# Patient Record
Sex: MALE | Race: WHITE | NOT HISPANIC OR LATINO | Employment: OTHER | URBAN - METROPOLITAN AREA
[De-identification: names, ages, dates, MRNs, and addresses within clinical notes are randomized per-mention and may not be internally consistent; named-entity substitution may affect disease eponyms.]

---

## 2017-10-30 ENCOUNTER — LAB REQUISITION (OUTPATIENT)
Dept: LAB | Facility: HOSPITAL | Age: 62
End: 2017-10-30

## 2017-10-30 DIAGNOSIS — E87.5 HYPERKALEMIA: ICD-10-CM

## 2017-10-30 LAB — POTASSIUM BLD-SCNC: 4.7 MMOL/L (ref 3.5–5.2)

## 2017-10-30 PROCEDURE — 84132 ASSAY OF SERUM POTASSIUM: CPT

## 2018-11-27 ENCOUNTER — LAB REQUISITION (OUTPATIENT)
Dept: LAB | Facility: HOSPITAL | Age: 63
End: 2018-11-27

## 2018-11-27 DIAGNOSIS — Z00.00 ROUTINE GENERAL MEDICAL EXAMINATION AT A HEALTH CARE FACILITY: ICD-10-CM

## 2018-11-27 LAB — POTASSIUM BLD-SCNC: 4.6 MMOL/L (ref 3.5–5.2)

## 2018-11-27 PROCEDURE — 84132 ASSAY OF SERUM POTASSIUM: CPT

## 2019-12-03 ENCOUNTER — LAB REQUISITION (OUTPATIENT)
Dept: LAB | Facility: HOSPITAL | Age: 64
End: 2019-12-03

## 2019-12-03 DIAGNOSIS — Z00.00 ROUTINE GENERAL MEDICAL EXAMINATION AT A HEALTH CARE FACILITY: ICD-10-CM

## 2019-12-03 LAB
ANION GAP SERPL CALCULATED.3IONS-SCNC: 12.1 MMOL/L (ref 5–15)
BUN BLD-MCNC: 38 MG/DL (ref 8–23)
BUN/CREAT SERPL: 39.6 (ref 7–25)
CALCIUM SPEC-SCNC: 9 MG/DL (ref 8.6–10.5)
CHLORIDE SERPL-SCNC: 101 MMOL/L (ref 98–107)
CO2 SERPL-SCNC: 29.9 MMOL/L (ref 22–29)
CREAT BLD-MCNC: 0.96 MG/DL (ref 0.76–1.27)
GFR SERPL CREATININE-BSD FRML MDRD: 79 ML/MIN/1.73
GLUCOSE BLD-MCNC: 103 MG/DL (ref 65–99)
POTASSIUM BLD-SCNC: 4.9 MMOL/L (ref 3.5–5.2)
SODIUM BLD-SCNC: 143 MMOL/L (ref 136–145)

## 2019-12-03 PROCEDURE — 80048 BASIC METABOLIC PNL TOTAL CA: CPT

## 2020-01-21 ENCOUNTER — OFFICE VISIT (OUTPATIENT)
Dept: SURGERY | Facility: CLINIC | Age: 65
End: 2020-01-21

## 2020-01-21 VITALS
OXYGEN SATURATION: 94 % | WEIGHT: 113 LBS | HEIGHT: 64 IN | RESPIRATION RATE: 16 BRPM | DIASTOLIC BLOOD PRESSURE: 81 MMHG | HEART RATE: 79 BPM | BODY MASS INDEX: 19.29 KG/M2 | SYSTOLIC BLOOD PRESSURE: 113 MMHG

## 2020-01-21 DIAGNOSIS — K40.90 RIGHT INGUINAL HERNIA: Primary | ICD-10-CM

## 2020-01-21 PROCEDURE — 99203 OFFICE O/P NEW LOW 30 MIN: CPT | Performed by: SURGERY

## 2020-01-21 RX ORDER — LACTOBACILLUS ACIDOPHILUS / LACTOBACILLUS BULGARICUS 100 MILLION CFU STRENGTH
GRANULES ORAL 3 TIMES DAILY
COMMUNITY

## 2020-01-21 RX ORDER — DIPHENHYDRAMINE HCL 25 MG
25 CAPSULE ORAL EVERY 8 HOURS PRN
COMMUNITY

## 2020-01-21 RX ORDER — LOPERAMIDE HYDROCHLORIDE 2 MG/1
2 CAPSULE ORAL 4 TIMES DAILY PRN
COMMUNITY

## 2020-01-21 NOTE — PROGRESS NOTES
Subjective   Manoj Jordan is a 64 y.o. male who presents to the office in surgical consultation from Isma Cervantes MD for a right inguinal hernia.    History of Present Illness     The patient has a history of an an anoxic brain injury occurring after a cardiac arrest.  He is now a nursing home resident requiring significant care.  He is unable to provide any history.  His sister is at the bedside assisting with the information.  He has been noted to have a bulge in the right groin.  He is seated for much of the day and the bulge is apparently uncomfortable.  There has been no noted change in his bowel or bladder function.    Review of Systems   Unable to perform ROS: Patient nonverbal     Past Medical History:   Diagnosis Date   • Anxiety    • Brain injury (CMS/HCC)    • Depression    • GERD (gastroesophageal reflux disease)    • H/O cardiac arrest     DUE TO HEROIN OVERDOSE IN 2005   • Heroin overdose (CMS/HCC)    • Pancreatitis    • UTI (urinary tract infection)      Past Surgical History:   Procedure Laterality Date   • BRONCHOSCOPY N/A 12/20/2018    w/ washing, Aria Engel   • BRONCHOSCOPY N/A 04/04/2019    Clemente Russell   • SUPRAPUBIC CATHETER INSERTION       No family history on file.  Social History     Socioeconomic History   • Marital status:      Spouse name: Not on file   • Number of children: Not on file   • Years of education: Not on file   • Highest education level: Not on file   Tobacco Use   • Smoking status: Former Smoker   • Smokeless tobacco: Never Used   Substance and Sexual Activity   • Alcohol use: No   • Drug use: No   • Sexual activity: Defer       Objective   Physical Exam   Constitutional: He appears well-developed and well-nourished.  Non-toxic appearance.   Eyes: EOM are normal. No scleral icterus.   Pulmonary/Chest: Effort normal. No respiratory distress.   Abdominal: Soft. Normal appearance. There is no tenderness. A hernia is present.  Hernia confirmed positive in the right inguinal area (Moderately sized right inguinal hernia that contains bowel). Hernia confirmed negative in the left inguinal area.   Skin: Skin is warm and dry.       Assessment/Plan       The encounter diagnosis was Right inguinal hernia.    The patient has a moderately sized right inguinal hernia that contains bowel.  He is at risk of developing an incarceration.  He will be scheduled for a right inguinal hernia repair.  The patient's sister understands the indications, alternatives, risks, and benefits of the procedure and wishes to proceed.

## 2020-01-22 RX ORDER — CEFAZOLIN SODIUM 2 G/100ML
2 INJECTION, SOLUTION INTRAVENOUS ONCE
Status: CANCELLED | OUTPATIENT
Start: 2020-02-19 | End: 2020-01-22

## 2020-01-23 ENCOUNTER — TELEPHONE (OUTPATIENT)
Dept: SURGERY | Facility: CLINIC | Age: 65
End: 2020-01-23

## 2020-01-23 PROBLEM — K40.90 RIGHT INGUINAL HERNIA: Status: ACTIVE | Noted: 2020-01-23

## 2020-02-18 RX ORDER — LEVOCETIRIZINE DIHYDROCHLORIDE 5 MG/1
5 TABLET, FILM COATED ORAL EVERY EVENING
COMMUNITY

## 2020-02-18 RX ORDER — OMEPRAZOLE 20 MG/1
20 CAPSULE, DELAYED RELEASE ORAL DAILY
COMMUNITY

## 2020-02-18 RX ORDER — IPRATROPIUM BROMIDE AND ALBUTEROL SULFATE 2.5; .5 MG/3ML; MG/3ML
3 SOLUTION RESPIRATORY (INHALATION) EVERY 4 HOURS PRN
COMMUNITY

## 2020-02-18 RX ORDER — GUAIFENESIN 200 MG/10ML
100 LIQUID ORAL 4 TIMES DAILY PRN
COMMUNITY

## 2020-02-19 ENCOUNTER — ANESTHESIA (OUTPATIENT)
Dept: PERIOP | Facility: HOSPITAL | Age: 65
End: 2020-02-19

## 2020-02-19 ENCOUNTER — HOSPITAL ENCOUNTER (OUTPATIENT)
Facility: HOSPITAL | Age: 65
Setting detail: HOSPITAL OUTPATIENT SURGERY
Discharge: NURSING, REHAB OR LONG-TERM CARE | End: 2020-02-19
Attending: SURGERY | Admitting: SURGERY

## 2020-02-19 ENCOUNTER — ANESTHESIA EVENT (OUTPATIENT)
Dept: PERIOP | Facility: HOSPITAL | Age: 65
End: 2020-02-19

## 2020-02-19 VITALS
WEIGHT: 113.2 LBS | TEMPERATURE: 97.5 F | RESPIRATION RATE: 18 BRPM | SYSTOLIC BLOOD PRESSURE: 112 MMHG | BODY MASS INDEX: 19.33 KG/M2 | OXYGEN SATURATION: 95 % | HEIGHT: 64 IN | DIASTOLIC BLOOD PRESSURE: 70 MMHG | HEART RATE: 58 BPM

## 2020-02-19 DIAGNOSIS — K40.90 RIGHT INGUINAL HERNIA: ICD-10-CM

## 2020-02-19 PROCEDURE — 25010000002 PHENYLEPHRINE PER 1 ML: Performed by: NURSE ANESTHETIST, CERTIFIED REGISTERED

## 2020-02-19 PROCEDURE — 25010000002 FENTANYL CITRATE (PF) 100 MCG/2ML SOLUTION: Performed by: NURSE ANESTHETIST, CERTIFIED REGISTERED

## 2020-02-19 PROCEDURE — C1781 MESH (IMPLANTABLE): HCPCS | Performed by: SURGERY

## 2020-02-19 PROCEDURE — C9290 INJ, BUPIVACAINE LIPOSOME: HCPCS | Performed by: SURGERY

## 2020-02-19 PROCEDURE — 25010000002 NEOSTIGMINE PER 0.5 MG: Performed by: NURSE ANESTHETIST, CERTIFIED REGISTERED

## 2020-02-19 PROCEDURE — 25010000002 PROPOFOL 10 MG/ML EMULSION: Performed by: NURSE ANESTHETIST, CERTIFIED REGISTERED

## 2020-02-19 PROCEDURE — 25010000002 DEXAMETHASONE PER 1 MG: Performed by: NURSE ANESTHETIST, CERTIFIED REGISTERED

## 2020-02-19 PROCEDURE — 25010000003 CEFAZOLIN IN DEXTROSE 2-4 GM/100ML-% SOLUTION: Performed by: NURSE ANESTHETIST, CERTIFIED REGISTERED

## 2020-02-19 PROCEDURE — 25010000003 BUPIVACAINE LIPOSOME 1.3 % SUSPENSION: Performed by: SURGERY

## 2020-02-19 PROCEDURE — 25010000002 ONDANSETRON PER 1 MG: Performed by: NURSE ANESTHETIST, CERTIFIED REGISTERED

## 2020-02-19 PROCEDURE — 49505 PRP I/HERN INIT REDUC >5 YR: CPT | Performed by: SURGERY

## 2020-02-19 DEVICE — BARD SOFT MESH
Type: IMPLANTABLE DEVICE | Site: INGUINAL | Status: FUNCTIONAL
Brand: BARD SOFT MESH

## 2020-02-19 RX ORDER — LIDOCAINE HYDROCHLORIDE 10 MG/ML
0.5 INJECTION, SOLUTION EPIDURAL; INFILTRATION; INTRACAUDAL; PERINEURAL ONCE AS NEEDED
Status: DISCONTINUED | OUTPATIENT
Start: 2020-02-19 | End: 2020-02-19 | Stop reason: HOSPADM

## 2020-02-19 RX ORDER — SODIUM CHLORIDE 0.9 % (FLUSH) 0.9 %
3 SYRINGE (ML) INJECTION EVERY 12 HOURS SCHEDULED
Status: DISCONTINUED | OUTPATIENT
Start: 2020-02-19 | End: 2020-02-19 | Stop reason: HOSPADM

## 2020-02-19 RX ORDER — DIPHENHYDRAMINE HCL 25 MG
25 CAPSULE ORAL
Status: DISCONTINUED | OUTPATIENT
Start: 2020-02-19 | End: 2020-02-19 | Stop reason: HOSPADM

## 2020-02-19 RX ORDER — GLYCOPYRROLATE 0.2 MG/ML
INJECTION INTRAMUSCULAR; INTRAVENOUS AS NEEDED
Status: DISCONTINUED | OUTPATIENT
Start: 2020-02-19 | End: 2020-02-19 | Stop reason: SURG

## 2020-02-19 RX ORDER — FENTANYL CITRATE 50 UG/ML
50 INJECTION, SOLUTION INTRAMUSCULAR; INTRAVENOUS
Status: DISCONTINUED | OUTPATIENT
Start: 2020-02-19 | End: 2020-02-19 | Stop reason: HOSPADM

## 2020-02-19 RX ORDER — LIDOCAINE HYDROCHLORIDE 20 MG/ML
INJECTION, SOLUTION INFILTRATION; PERINEURAL AS NEEDED
Status: DISCONTINUED | OUTPATIENT
Start: 2020-02-19 | End: 2020-02-19 | Stop reason: SURG

## 2020-02-19 RX ORDER — ONDANSETRON 2 MG/ML
4 INJECTION INTRAMUSCULAR; INTRAVENOUS ONCE AS NEEDED
Status: DISCONTINUED | OUTPATIENT
Start: 2020-02-19 | End: 2020-02-19 | Stop reason: HOSPADM

## 2020-02-19 RX ORDER — OXYCODONE AND ACETAMINOPHEN 7.5; 325 MG/1; MG/1
1 TABLET ORAL ONCE AS NEEDED
Status: DISCONTINUED | OUTPATIENT
Start: 2020-02-19 | End: 2020-02-19 | Stop reason: HOSPADM

## 2020-02-19 RX ORDER — PROMETHAZINE HYDROCHLORIDE 25 MG/ML
12.5 INJECTION, SOLUTION INTRAMUSCULAR; INTRAVENOUS ONCE AS NEEDED
Status: DISCONTINUED | OUTPATIENT
Start: 2020-02-19 | End: 2020-02-19 | Stop reason: HOSPADM

## 2020-02-19 RX ORDER — DEXAMETHASONE SODIUM PHOSPHATE 4 MG/ML
INJECTION, SOLUTION INTRA-ARTICULAR; INTRALESIONAL; INTRAMUSCULAR; INTRAVENOUS; SOFT TISSUE AS NEEDED
Status: DISCONTINUED | OUTPATIENT
Start: 2020-02-19 | End: 2020-02-19 | Stop reason: SURG

## 2020-02-19 RX ORDER — PROMETHAZINE HYDROCHLORIDE 25 MG/1
25 SUPPOSITORY RECTAL ONCE AS NEEDED
Status: DISCONTINUED | OUTPATIENT
Start: 2020-02-19 | End: 2020-02-19 | Stop reason: HOSPADM

## 2020-02-19 RX ORDER — FLUMAZENIL 0.1 MG/ML
0.2 INJECTION INTRAVENOUS AS NEEDED
Status: DISCONTINUED | OUTPATIENT
Start: 2020-02-19 | End: 2020-02-19 | Stop reason: HOSPADM

## 2020-02-19 RX ORDER — CEFAZOLIN SODIUM 2 G/100ML
2 INJECTION, SOLUTION INTRAVENOUS ONCE
Status: DISCONTINUED | OUTPATIENT
Start: 2020-02-19 | End: 2020-02-19 | Stop reason: HOSPADM

## 2020-02-19 RX ORDER — FAMOTIDINE 10 MG/ML
20 INJECTION, SOLUTION INTRAVENOUS ONCE
Status: COMPLETED | OUTPATIENT
Start: 2020-02-19 | End: 2020-02-19

## 2020-02-19 RX ORDER — HYDROCODONE BITARTRATE AND ACETAMINOPHEN 7.5; 325 MG/1; MG/1
1 TABLET ORAL ONCE AS NEEDED
Status: DISCONTINUED | OUTPATIENT
Start: 2020-02-19 | End: 2020-02-19 | Stop reason: HOSPADM

## 2020-02-19 RX ORDER — MIDAZOLAM HYDROCHLORIDE 1 MG/ML
1 INJECTION INTRAMUSCULAR; INTRAVENOUS
Status: DISCONTINUED | OUTPATIENT
Start: 2020-02-19 | End: 2020-02-19 | Stop reason: HOSPADM

## 2020-02-19 RX ORDER — HYDROMORPHONE HYDROCHLORIDE 1 MG/ML
0.5 INJECTION, SOLUTION INTRAMUSCULAR; INTRAVENOUS; SUBCUTANEOUS
Status: DISCONTINUED | OUTPATIENT
Start: 2020-02-19 | End: 2020-02-19 | Stop reason: HOSPADM

## 2020-02-19 RX ORDER — LABETALOL HYDROCHLORIDE 5 MG/ML
5 INJECTION, SOLUTION INTRAVENOUS
Status: DISCONTINUED | OUTPATIENT
Start: 2020-02-19 | End: 2020-02-19 | Stop reason: HOSPADM

## 2020-02-19 RX ORDER — BUPIVACAINE HYDROCHLORIDE AND EPINEPHRINE 5; 5 MG/ML; UG/ML
INJECTION, SOLUTION PERINEURAL AS NEEDED
Status: DISCONTINUED | OUTPATIENT
Start: 2020-02-19 | End: 2020-02-19 | Stop reason: HOSPADM

## 2020-02-19 RX ORDER — HYDRALAZINE HYDROCHLORIDE 20 MG/ML
5 INJECTION INTRAMUSCULAR; INTRAVENOUS
Status: DISCONTINUED | OUTPATIENT
Start: 2020-02-19 | End: 2020-02-19 | Stop reason: HOSPADM

## 2020-02-19 RX ORDER — MIDAZOLAM HYDROCHLORIDE 1 MG/ML
2 INJECTION INTRAMUSCULAR; INTRAVENOUS
Status: DISCONTINUED | OUTPATIENT
Start: 2020-02-19 | End: 2020-02-19 | Stop reason: HOSPADM

## 2020-02-19 RX ORDER — CEFAZOLIN SODIUM 2 G/100ML
INJECTION, SOLUTION INTRAVENOUS AS NEEDED
Status: DISCONTINUED | OUTPATIENT
Start: 2020-02-19 | End: 2020-02-19 | Stop reason: SURG

## 2020-02-19 RX ORDER — ACETAMINOPHEN 325 MG/1
650 TABLET ORAL ONCE AS NEEDED
Status: DISCONTINUED | OUTPATIENT
Start: 2020-02-19 | End: 2020-02-19 | Stop reason: HOSPADM

## 2020-02-19 RX ORDER — FENTANYL CITRATE 50 UG/ML
INJECTION, SOLUTION INTRAMUSCULAR; INTRAVENOUS AS NEEDED
Status: DISCONTINUED | OUTPATIENT
Start: 2020-02-19 | End: 2020-02-19 | Stop reason: SURG

## 2020-02-19 RX ORDER — ROCURONIUM BROMIDE 10 MG/ML
INJECTION, SOLUTION INTRAVENOUS AS NEEDED
Status: DISCONTINUED | OUTPATIENT
Start: 2020-02-19 | End: 2020-02-19 | Stop reason: SURG

## 2020-02-19 RX ORDER — DIPHENHYDRAMINE HYDROCHLORIDE 50 MG/ML
12.5 INJECTION INTRAMUSCULAR; INTRAVENOUS
Status: DISCONTINUED | OUTPATIENT
Start: 2020-02-19 | End: 2020-02-19 | Stop reason: HOSPADM

## 2020-02-19 RX ORDER — NALOXONE HCL 0.4 MG/ML
0.2 VIAL (ML) INJECTION AS NEEDED
Status: DISCONTINUED | OUTPATIENT
Start: 2020-02-19 | End: 2020-02-19 | Stop reason: HOSPADM

## 2020-02-19 RX ORDER — SODIUM CHLORIDE 0.9 % (FLUSH) 0.9 %
3-10 SYRINGE (ML) INJECTION AS NEEDED
Status: DISCONTINUED | OUTPATIENT
Start: 2020-02-19 | End: 2020-02-19 | Stop reason: HOSPADM

## 2020-02-19 RX ORDER — PROPOFOL 10 MG/ML
VIAL (ML) INTRAVENOUS AS NEEDED
Status: DISCONTINUED | OUTPATIENT
Start: 2020-02-19 | End: 2020-02-19 | Stop reason: SURG

## 2020-02-19 RX ORDER — ONDANSETRON 2 MG/ML
INJECTION INTRAMUSCULAR; INTRAVENOUS AS NEEDED
Status: DISCONTINUED | OUTPATIENT
Start: 2020-02-19 | End: 2020-02-19 | Stop reason: SURG

## 2020-02-19 RX ORDER — OXYCODONE HYDROCHLORIDE AND ACETAMINOPHEN 5; 325 MG/1; MG/1
TABLET ORAL
Qty: 30 TABLET | Refills: 0 | Status: ON HOLD | OUTPATIENT
Start: 2020-02-19 | End: 2020-11-09 | Stop reason: SDUPTHER

## 2020-02-19 RX ORDER — PROMETHAZINE HYDROCHLORIDE 25 MG/1
25 TABLET ORAL ONCE AS NEEDED
Status: DISCONTINUED | OUTPATIENT
Start: 2020-02-19 | End: 2020-02-19 | Stop reason: HOSPADM

## 2020-02-19 RX ORDER — PROMETHAZINE HYDROCHLORIDE 25 MG/ML
6.25 INJECTION, SOLUTION INTRAMUSCULAR; INTRAVENOUS
Status: DISCONTINUED | OUTPATIENT
Start: 2020-02-19 | End: 2020-02-19 | Stop reason: HOSPADM

## 2020-02-19 RX ORDER — EPHEDRINE SULFATE 50 MG/ML
5 INJECTION, SOLUTION INTRAVENOUS ONCE AS NEEDED
Status: DISCONTINUED | OUTPATIENT
Start: 2020-02-19 | End: 2020-02-19 | Stop reason: HOSPADM

## 2020-02-19 RX ORDER — SODIUM CHLORIDE, SODIUM LACTATE, POTASSIUM CHLORIDE, CALCIUM CHLORIDE 600; 310; 30; 20 MG/100ML; MG/100ML; MG/100ML; MG/100ML
9 INJECTION, SOLUTION INTRAVENOUS CONTINUOUS
Status: DISCONTINUED | OUTPATIENT
Start: 2020-02-19 | End: 2020-02-19 | Stop reason: HOSPADM

## 2020-02-19 RX ADMIN — GLYCOPYRROLATE 0.6 MG: 0.2 INJECTION INTRAMUSCULAR; INTRAVENOUS at 13:26

## 2020-02-19 RX ADMIN — CEFAZOLIN SODIUM 2 G: 2 INJECTION, SOLUTION INTRAVENOUS at 12:12

## 2020-02-19 RX ADMIN — ROCURONIUM BROMIDE 40 MG: 10 INJECTION, SOLUTION INTRAVENOUS at 12:25

## 2020-02-19 RX ADMIN — PHENYLEPHRINE HYDROCHLORIDE 200 MCG: 10 INJECTION INTRAVENOUS at 12:32

## 2020-02-19 RX ADMIN — FAMOTIDINE 20 MG: 10 INJECTION INTRAVENOUS at 09:54

## 2020-02-19 RX ADMIN — LIDOCAINE HYDROCHLORIDE 100 MG: 20 INJECTION, SOLUTION INFILTRATION; PERINEURAL at 12:25

## 2020-02-19 RX ADMIN — PROPOFOL 100 MG: 10 INJECTION, EMULSION INTRAVENOUS at 12:25

## 2020-02-19 RX ADMIN — NEOSTIGMINE METHYLSULFATE 3 MG: 1 INJECTION INTRAMUSCULAR; INTRAVENOUS; SUBCUTANEOUS at 13:26

## 2020-02-19 RX ADMIN — ONDANSETRON HYDROCHLORIDE 4 MG: 2 SOLUTION INTRAMUSCULAR; INTRAVENOUS at 13:23

## 2020-02-19 RX ADMIN — FENTANYL CITRATE 50 MCG: 50 INJECTION INTRAMUSCULAR; INTRAVENOUS at 13:00

## 2020-02-19 RX ADMIN — DEXAMETHASONE SODIUM PHOSPHATE 8 MG: 4 INJECTION INTRA-ARTICULAR; INTRALESIONAL; INTRAMUSCULAR; INTRAVENOUS; SOFT TISSUE at 12:36

## 2020-02-19 RX ADMIN — SUGAMMADEX 200 MG: 100 INJECTION, SOLUTION INTRAVENOUS at 13:38

## 2020-02-19 RX ADMIN — FENTANYL CITRATE 50 MCG: 50 INJECTION INTRAMUSCULAR; INTRAVENOUS at 12:20

## 2020-02-19 RX ADMIN — SODIUM CHLORIDE, POTASSIUM CHLORIDE, SODIUM LACTATE AND CALCIUM CHLORIDE 9 ML/HR: 600; 310; 30; 20 INJECTION, SOLUTION INTRAVENOUS at 09:35

## 2020-02-19 NOTE — ANESTHESIA PROCEDURE NOTES
Airway  Urgency: elective    Date/Time: 2/19/2020 12:27 PM  Airway not difficult    General Information and Staff    Patient location during procedure: OR  Anesthesiologist: Victorino Harding MD  CRNA: Pratima Dorado CRNA    Indications and Patient Condition  Indications for airway management: airway protection    Preoxygenated: yes      Final Airway Details  Final airway type: endotracheal airway      Cuffed: yes   Successful intubation technique: direct laryngoscopy  Facilitating devices/methods: intubating stylet  Endotracheal tube insertion site: oral  Blade: Shaffer  Blade size: 2  Cormack-Lehane Classification: grade IIa - partial view of glottis  Placement verified by: chest auscultation   Measured from: lips  Number of attempts at approach: 1  Assessment: lips, teeth, and gum same as pre-op and atraumatic intubation

## 2020-02-19 NOTE — PROGRESS NOTES
Darrin advised Phase 2 needs assistance with transport of patient back to [facility]; Provided [facility] office number for assistance.     Darrin returned call and advised there is no answer at either phone number. Called phase 2 myself and spoke linda/Giovanna who provided patient information and history for medical necessity for ambulance. Called [facility] and requested patient transport to return to [facility]; provided patient history, place of residence and insurance info. .[facility] will be calling phase 2 for further information. Christel Coe RN

## 2020-02-19 NOTE — DISCHARGE INSTRUCTIONS
Dr. Erasmo Vital   [address]  [place], [place] [address] [phone]    Discharge Instructions for Hernia Surgery      1. Go home, rest and take it easy today; however, you should get up and move about several times today to reduce the risk of developing a clot in your legs.      2. You may experience some dizziness or memory loss from the anesthesia.  This may last for the next 24 hours.  Someone should plan on staying with you for the first 24 hours for your safety.    3. Do not make any important legal decisions or sign any legal papers for the next 24 hours.      4. Eat and drink lightly today.  Start off with liquids, jello, soup, crackers or other bland foods at first. You may advance your diet tomorrow as tolerated as long as you do not experience any nausea or vomiting.     5. You may remove your outer dressings in 2 days.  The white tapes called steri-strips should stay in place.  They will fall off on their own in 1-2 weeks.  Do not worry if they come off sooner.      6. You may notice some bleeding/drainage on your outer dressings. A little bloody drainage is normal. If the bleeding/drainage is such that the bandage cannot absorb it, remove the dressing, apply clean gauze and apply firm pressure for a full 15 minutes.  If the bleeding continues, please call me.    7. You may shower tomorrow.  No tub baths until your incisions are completely healed.     8. No lifting > 20 lbs. until you are seen at your follow-up visit.         9. You have received a prescription for a narcotic pain medicine, as you will have some pain following surgery.   You will not be totally pain free, but your pain medicine should make the pain tolerable.  Please take your pain medicine as prescribed and always take your pills with food to prevent nausea. If you are having severe pain that cannot be controlled by the pain medicine, please contact me.      10. If you had a laparoscopic surgery, it is not unusual to  experience pain/discomfort in your shoulders or under your ribs after surgery.  It is from the gas used during the laparoscopic procedure and usually lasts 1-3 days.  The prescription pain medicine is used to treat the surgical pain and does not typically alleviate this “gassy” pain.     11. No driving for 24 hours and for as long as you are taking your prescription pain medicine.      12. You will need to call the office at [phone] to schedule a follow-up appointment in 2 weeks.           13. Remember to contact me for any of the following:    • Fever > 101 degrees  • Severe pain that cannot be controlled by taking your pain pills  • Severe nausea or vomiting   • Significant bleeding of your incisions  • Drainage that has a bad smell or is yellow or green in appearance  • Any other questions or concerns        Additional Instruction for Inguinal Hernia Patients Only    1. If you did not urinate at the hospital after your surgery or if you feel the need to urinate and cannot, this will necessitate a return to the Emergency Room for placement of a urinary catheter.  You should also notify me as well.  As a rule, you should be able to empty your bladder within 4-6 hours after discharge from the hospital.      You may notice some scrotal bruising and/or swelling. A scrotal support or briefs as well as ice packs may be used to alleviate discomfort

## 2020-02-19 NOTE — ANESTHESIA POSTPROCEDURE EVALUATION
"Patient: Manoj Jordan    Procedure Summary     Date:  02/19/20 Room / Location:  [facility] OR 03 / [facility] MAIN OR    Anesthesia Start:  1212 Anesthesia Stop:  1407    Procedure:  RIGHT INGUINAL HERNIA REPAIR WITH MESH (Right Abdomen) Diagnosis:       Right inguinal hernia      (Right inguinal hernia [K40.90])    Surgeon:  Erasmo Vital Jr., MD Provider:  Victorino Harding MD    Anesthesia Type:  general ASA Status:  4          Anesthesia Type: general    Vitals  Vitals Value Taken Time   /95 2/19/2020  2:48 PM   Temp 36.4 °C (97.5 °F) 2/19/2020  2:02 PM   Pulse 70 2/19/2020  2:58 PM   Resp 16 2/19/2020  2:15 PM   SpO2 93 % 2/19/2020  2:58 PM   Vitals shown include unvalidated device data.        Post Anesthesia Care and Evaluation    Patient location during evaluation: bedside  Level of consciousness: awake  Pain management: adequate  Airway patency: patent  Anesthetic complications: No anesthetic complications    Cardiovascular status: acceptable  Respiratory status: acceptable  Hydration status: acceptable    Comments: /79   Pulse 74   Temp 36.4 °C (97.5 °F) (Oral)   Resp 16   Ht 162.6 cm (64\")   Wt 51.3 kg (113 lb 3.2 oz)   SpO2 100%   BMI 19.43 kg/m²         "

## 2020-02-19 NOTE — PERIOPERATIVE NURSING NOTE
Called  to see if we could use [facility].      Spoke with Qi with [facility] and stated she will call back to see if she can set up transportation.

## 2020-02-19 NOTE — PERIOPERATIVE NURSING NOTE
Called [facility] and they stated they did not have a truck to send us for the patient to be picked up.  Did not give ETA.

## 2020-02-19 NOTE — OP NOTE
Surgeon: Erasmo Vital Jr., M.D.    Assistant: Erasmo Vital MD    An assistant was necessary during this procedure by providing crucial exposure.    Pre-Operative Diagnosis:     Right inguinal hernia [K40.90]    Post-Operative Diagnosis:    Post-Op Diagnosis Codes:     * Right inguinal hernia [K40.90]    Procedure Performed:     Right inguinal hernia repair    Indications:     The patient is a 64-year-old white male with previous anoxic brain injury who is a nursing home resident and has developed a right inguinal hernia.  He is able to stand and this causes the right inguinal hernia to enlarge and and it clearly contains bowel.  He presents for right inguinal hernia repair.  The patient's sister understands the indications, alternatives, risks, and benefits of the procedure and wishes to proceed.    Procedure:     The patient was identified and taken to the operating room where he was placed in the supine position on the operating table.  Monitors were placed and he underwent a general endotracheal anesthesia and was appropriately monitored throughout the case by the anesthesia personnel.  The right groin was prepped and draped in the standard surgical fashion.  A standard groin incision was made with a scalpel and carried through the skin into the subcutaneous tissue.  The subcutaneous tissue was divided using Bovie electrocautery to the external oblique aponeurosis which was then divided in direction of its fibers.  The spermatic cord was then identified, surrounded, and elevated out of the groin with a Penrose drain.  The spermatic cord was skeletonized by dividing cremasteric fibers using Bovie electrocautery.  The patient was noted to have an indirect hernia.  The hernia was freed from attachments to the spermatic cord and divided in a high ligation fashion with 2-0 Vicryl suture.  A piece of mesh was selected, soaked in antibiotics, and fashioned appropriately using a 3 x 6 Bard soft mesh.  It was then  sutured in place using a Taylor type repair.  It was sutured to the pubic tubercle and along the inguinal ligament using 2-0 proline sutures.  It was then tacked along conjoined tendon using 0 Ethibond sutures in an interrupted fashion.  Legs were created to reestablish the internal ring and secured with the 0 Ethibond sutures.  The cord was noted to be hemostatic.  The area was infiltrated with 0.5% Marcaine with epinephrine as well as Exparel.  The spermatic cord was returned to its original position.  The external oblique aponeurosis was reapproximated using 2-0 Vicryl sutures.  Hazel's fascia was reapproximated using 3-0 Vicryl sutures in a running fashion.  The skin was then closed with 4-0 Monocryl in a subcuticular fashion followed by Mastisol and Steri-Strips and a sterile dressing.  The sponge, needle, and instrument counts were correct at the end the case.  The patient tolerated procedure well and was transferred to the recovery room in stable condition.    Estimated Blood Loss:      minimal    Specimens:     None    Erasmo Vital Jr., M.D.

## 2020-03-05 ENCOUNTER — OFFICE VISIT (OUTPATIENT)
Dept: SURGERY | Facility: CLINIC | Age: 65
End: 2020-03-05

## 2020-03-05 DIAGNOSIS — Z48.89 POSTOPERATIVE VISIT: Primary | ICD-10-CM

## 2020-03-05 PROCEDURE — 99024 POSTOP FOLLOW-UP VISIT: CPT | Performed by: SURGERY

## 2020-03-05 NOTE — PROGRESS NOTES
Subjective   Manoj Jordan is a 64 y.o. male who returns to the office after undergoing a right inguinal hernia repair on 2/19/2020.     History of Present Illness     The patient is only able to answer simple questions due to a brain injury.  He is not having any abdominal pain.  His bowel function appears to be at its baseline.    Review of Systems   Respiratory: Negative for shortness of breath.    Cardiovascular: Negative for chest pain.   Gastrointestinal: Negative for abdominal distention and abdominal pain.       Objective   Physical Exam   Abdominal: Soft. There is no tenderness.   Skin:   Incision: Intact with no evidence of infection.       Assessment/Plan   The encounter diagnosis was Postoperative visit.    The patient is recovering well from his right inguinal hernia repair.  He will follow-up on an as-needed basis.

## 2020-11-03 ENCOUNTER — HOSPITAL ENCOUNTER (INPATIENT)
Facility: HOSPITAL | Age: 65
LOS: 5 days | Discharge: NURSING, REHAB OR LONG-TERM CARE | End: 2020-11-09
Attending: EMERGENCY MEDICINE | Admitting: HOSPITALIST

## 2020-11-03 ENCOUNTER — APPOINTMENT (OUTPATIENT)
Dept: GENERAL RADIOLOGY | Facility: HOSPITAL | Age: 65
End: 2020-11-03

## 2020-11-03 DIAGNOSIS — U07.1 PNEUMONIA DUE TO COVID-19 VIRUS: Primary | ICD-10-CM

## 2020-11-03 DIAGNOSIS — J12.82 PNEUMONIA DUE TO COVID-19 VIRUS: Primary | ICD-10-CM

## 2020-11-03 DIAGNOSIS — K40.90 RIGHT INGUINAL HERNIA: ICD-10-CM

## 2020-11-03 PROCEDURE — 83880 ASSAY OF NATRIURETIC PEPTIDE: CPT | Performed by: EMERGENCY MEDICINE

## 2020-11-03 PROCEDURE — 80053 COMPREHEN METABOLIC PANEL: CPT | Performed by: EMERGENCY MEDICINE

## 2020-11-03 PROCEDURE — 87040 BLOOD CULTURE FOR BACTERIA: CPT | Performed by: EMERGENCY MEDICINE

## 2020-11-03 PROCEDURE — 84484 ASSAY OF TROPONIN QUANT: CPT | Performed by: EMERGENCY MEDICINE

## 2020-11-03 PROCEDURE — 85025 COMPLETE CBC W/AUTO DIFF WBC: CPT | Performed by: EMERGENCY MEDICINE

## 2020-11-03 PROCEDURE — 99285 EMERGENCY DEPT VISIT HI MDM: CPT

## 2020-11-03 PROCEDURE — 84145 PROCALCITONIN (PCT): CPT | Performed by: EMERGENCY MEDICINE

## 2020-11-03 PROCEDURE — 93005 ELECTROCARDIOGRAM TRACING: CPT | Performed by: EMERGENCY MEDICINE

## 2020-11-03 PROCEDURE — 87086 URINE CULTURE/COLONY COUNT: CPT | Performed by: HOSPITALIST

## 2020-11-03 PROCEDURE — 71045 X-RAY EXAM CHEST 1 VIEW: CPT

## 2020-11-03 PROCEDURE — 83605 ASSAY OF LACTIC ACID: CPT | Performed by: EMERGENCY MEDICINE

## 2020-11-03 PROCEDURE — 81001 URINALYSIS AUTO W/SCOPE: CPT | Performed by: EMERGENCY MEDICINE

## 2020-11-03 RX ORDER — SODIUM CHLORIDE 0.9 % (FLUSH) 0.9 %
10 SYRINGE (ML) INJECTION AS NEEDED
Status: DISCONTINUED | OUTPATIENT
Start: 2020-11-03 | End: 2020-11-09 | Stop reason: HOSPADM

## 2020-11-04 PROBLEM — J12.82 PNEUMONIA DUE TO COVID-19 VIRUS: Status: ACTIVE | Noted: 2020-11-04

## 2020-11-04 PROBLEM — U07.1 PNEUMONIA DUE TO COVID-19 VIRUS: Status: ACTIVE | Noted: 2020-11-04

## 2020-11-04 LAB
ALBUMIN SERPL-MCNC: 3.2 G/DL (ref 3.5–5.2)
ALBUMIN SERPL-MCNC: 3.2 G/DL (ref 3.5–5.2)
ALBUMIN/GLOB SERPL: 0.8 G/DL
ALP SERPL-CCNC: 76 U/L (ref 39–117)
ALP SERPL-CCNC: 79 U/L (ref 39–117)
ALT SERPL W P-5'-P-CCNC: 34 U/L (ref 1–41)
ALT SERPL W P-5'-P-CCNC: 34 U/L (ref 1–41)
ANION GAP SERPL CALCULATED.3IONS-SCNC: 6.9 MMOL/L (ref 5–15)
ANION GAP SERPL CALCULATED.3IONS-SCNC: 7.9 MMOL/L (ref 5–15)
AST SERPL-CCNC: 47 U/L (ref 1–40)
AST SERPL-CCNC: 53 U/L (ref 1–40)
B PARAPERT DNA SPEC QL NAA+PROBE: NOT DETECTED
B PERT DNA SPEC QL NAA+PROBE: NOT DETECTED
BACTERIA UR QL AUTO: ABNORMAL /HPF
BASOPHILS # BLD AUTO: 0.02 10*3/MM3 (ref 0–0.2)
BASOPHILS NFR BLD AUTO: 0.4 % (ref 0–1.5)
BILIRUB CONJ SERPL-MCNC: 0.3 MG/DL (ref 0–0.3)
BILIRUB INDIRECT SERPL-MCNC: 0.6 MG/DL
BILIRUB SERPL-MCNC: 0.9 MG/DL (ref 0–1.2)
BILIRUB SERPL-MCNC: 1.3 MG/DL (ref 0–1.2)
BILIRUB UR QL STRIP: NEGATIVE
BUN SERPL-MCNC: 35 MG/DL (ref 8–23)
BUN SERPL-MCNC: 36 MG/DL (ref 8–23)
BUN/CREAT SERPL: 35.7 (ref 7–25)
BUN/CREAT SERPL: 37.9 (ref 7–25)
C PNEUM DNA NPH QL NAA+NON-PROBE: NOT DETECTED
CALCIUM SPEC-SCNC: 9.3 MG/DL (ref 8.6–10.5)
CALCIUM SPEC-SCNC: 9.5 MG/DL (ref 8.6–10.5)
CHLORIDE SERPL-SCNC: 107 MMOL/L (ref 98–107)
CHLORIDE SERPL-SCNC: 110 MMOL/L (ref 98–107)
CLARITY UR: ABNORMAL
CO2 SERPL-SCNC: 30.1 MMOL/L (ref 22–29)
CO2 SERPL-SCNC: 31.1 MMOL/L (ref 22–29)
COLOR UR: ABNORMAL
CREAT SERPL-MCNC: 0.95 MG/DL (ref 0.76–1.27)
CREAT SERPL-MCNC: 0.98 MG/DL (ref 0.76–1.27)
CREAT SERPL-MCNC: 1.04 MG/DL (ref 0.76–1.27)
D DIMER PPP FEU-MCNC: 0.71 MCGFEU/ML (ref 0–0.49)
D-LACTATE SERPL-SCNC: 1.3 MMOL/L (ref 0.5–2)
DEPRECATED RDW RBC AUTO: 50.5 FL (ref 37–54)
DEPRECATED RDW RBC AUTO: 50.6 FL (ref 37–54)
EOSINOPHIL # BLD AUTO: 0.01 10*3/MM3 (ref 0–0.4)
EOSINOPHIL NFR BLD AUTO: 0.2 % (ref 0.3–6.2)
ERYTHROCYTE [DISTWIDTH] IN BLOOD BY AUTOMATED COUNT: 14.9 % (ref 12.3–15.4)
ERYTHROCYTE [DISTWIDTH] IN BLOOD BY AUTOMATED COUNT: 15.2 % (ref 12.3–15.4)
FERRITIN SERPL-MCNC: 444 NG/ML (ref 30–400)
FLUAV SUBTYP SPEC NAA+PROBE: NOT DETECTED
FLUBV RNA ISLT QL NAA+PROBE: NOT DETECTED
GFR SERPL CREATININE-BSD FRML MDRD: 72 ML/MIN/1.73
GFR SERPL CREATININE-BSD FRML MDRD: 77 ML/MIN/1.73
GFR SERPL CREATININE-BSD FRML MDRD: 80 ML/MIN/1.73
GLOBULIN UR ELPH-MCNC: 4.1 GM/DL
GLUCOSE SERPL-MCNC: 100 MG/DL (ref 65–99)
GLUCOSE SERPL-MCNC: 124 MG/DL (ref 65–99)
GLUCOSE UR STRIP-MCNC: ABNORMAL MG/DL
HADV DNA SPEC NAA+PROBE: NOT DETECTED
HCOV 229E RNA SPEC QL NAA+PROBE: NOT DETECTED
HCOV HKU1 RNA SPEC QL NAA+PROBE: NOT DETECTED
HCOV NL63 RNA SPEC QL NAA+PROBE: NOT DETECTED
HCOV OC43 RNA SPEC QL NAA+PROBE: NOT DETECTED
HCT VFR BLD AUTO: 41.4 % (ref 37.5–51)
HCT VFR BLD AUTO: 42.5 % (ref 37.5–51)
HGB BLD-MCNC: 13.2 G/DL (ref 13–17.7)
HGB BLD-MCNC: 13.7 G/DL (ref 13–17.7)
HGB UR QL STRIP.AUTO: ABNORMAL
HMPV RNA NPH QL NAA+NON-PROBE: NOT DETECTED
HPIV1 RNA SPEC QL NAA+PROBE: NOT DETECTED
HPIV2 RNA SPEC QL NAA+PROBE: NOT DETECTED
HPIV3 RNA NPH QL NAA+PROBE: NOT DETECTED
HPIV4 P GENE NPH QL NAA+PROBE: NOT DETECTED
HYALINE CASTS UR QL AUTO: ABNORMAL /LPF
IMM GRANULOCYTES # BLD AUTO: 0.03 10*3/MM3 (ref 0–0.05)
IMM GRANULOCYTES NFR BLD AUTO: 0.6 % (ref 0–0.5)
KETONES UR QL STRIP: NEGATIVE
LDH SERPL-CCNC: 254 U/L (ref 135–225)
LEUKOCYTE ESTERASE UR QL STRIP.AUTO: ABNORMAL
LYMPHOCYTES # BLD AUTO: 0.97 10*3/MM3 (ref 0.7–3.1)
LYMPHOCYTES NFR BLD AUTO: 19.2 % (ref 19.6–45.3)
M PNEUMO IGG SER IA-ACNC: NOT DETECTED
MCH RBC QN AUTO: 29.2 PG (ref 26.6–33)
MCH RBC QN AUTO: 29.5 PG (ref 26.6–33)
MCHC RBC AUTO-ENTMCNC: 31.9 G/DL (ref 31.5–35.7)
MCHC RBC AUTO-ENTMCNC: 32.2 G/DL (ref 31.5–35.7)
MCV RBC AUTO: 91.6 FL (ref 79–97)
MCV RBC AUTO: 91.6 FL (ref 79–97)
MONOCYTES # BLD AUTO: 0.55 10*3/MM3 (ref 0.1–0.9)
MONOCYTES NFR BLD AUTO: 10.9 % (ref 5–12)
NEUTROPHILS NFR BLD AUTO: 3.47 10*3/MM3 (ref 1.7–7)
NEUTROPHILS NFR BLD AUTO: 68.7 % (ref 42.7–76)
NITRITE UR QL STRIP: NEGATIVE
NRBC BLD AUTO-RTO: 0 /100 WBC (ref 0–0.2)
NT-PROBNP SERPL-MCNC: 104.1 PG/ML (ref 0–900)
PH UR STRIP.AUTO: >=9 [PH] (ref 5–8)
PLATELET # BLD AUTO: 118 10*3/MM3 (ref 140–450)
PLATELET # BLD AUTO: 119 10*3/MM3 (ref 140–450)
PMV BLD AUTO: 11.9 FL (ref 6–12)
PMV BLD AUTO: 12 FL (ref 6–12)
POTASSIUM SERPL-SCNC: 4.2 MMOL/L (ref 3.5–5.2)
POTASSIUM SERPL-SCNC: 4.8 MMOL/L (ref 3.5–5.2)
PROCALCITONIN SERPL-MCNC: 0.18 NG/ML (ref 0–0.25)
PROCALCITONIN SERPL-MCNC: 0.19 NG/ML (ref 0–0.25)
PROT SERPL-MCNC: 7 G/DL (ref 6–8.5)
PROT SERPL-MCNC: 7.3 G/DL (ref 6–8.5)
PROT UR QL STRIP: ABNORMAL
QT INTERVAL: 408 MS
RBC # BLD AUTO: 4.52 10*6/MM3 (ref 4.14–5.8)
RBC # BLD AUTO: 4.64 10*6/MM3 (ref 4.14–5.8)
RBC # UR: ABNORMAL /HPF
REF LAB TEST METHOD: ABNORMAL
RHINOVIRUS RNA SPEC NAA+PROBE: NOT DETECTED
RSV RNA NPH QL NAA+NON-PROBE: NOT DETECTED
SARS-COV-2 RNA NPH QL NAA+NON-PROBE: DETECTED
SODIUM SERPL-SCNC: 146 MMOL/L (ref 136–145)
SODIUM SERPL-SCNC: 147 MMOL/L (ref 136–145)
SP GR UR STRIP: 1.02 (ref 1–1.03)
SQUAMOUS #/AREA URNS HPF: ABNORMAL /HPF
TRI-PHOS CRY URNS QL MICRO: ABNORMAL /HPF
TROPONIN T SERPL-MCNC: <0.01 NG/ML (ref 0–0.03)
UNIDENT CRYS URNS QL MICRO: ABNORMAL /HPF
UROBILINOGEN UR QL STRIP: ABNORMAL
WBC # BLD AUTO: 4.15 10*3/MM3 (ref 3.4–10.8)
WBC # BLD AUTO: 5.05 10*3/MM3 (ref 3.4–10.8)
WBC UR QL AUTO: ABNORMAL /HPF

## 2020-11-04 PROCEDURE — 80076 HEPATIC FUNCTION PANEL: CPT | Performed by: HOSPITALIST

## 2020-11-04 PROCEDURE — 82565 ASSAY OF CREATININE: CPT | Performed by: HOSPITALIST

## 2020-11-04 PROCEDURE — 36415 COLL VENOUS BLD VENIPUNCTURE: CPT | Performed by: NURSE PRACTITIONER

## 2020-11-04 PROCEDURE — 0202U NFCT DS 22 TRGT SARS-COV-2: CPT | Performed by: EMERGENCY MEDICINE

## 2020-11-04 PROCEDURE — 25010000002 DEXAMETHASONE SODIUM PHOSPHATE 20 MG/5ML SOLUTION: Performed by: EMERGENCY MEDICINE

## 2020-11-04 PROCEDURE — 85027 COMPLETE CBC AUTOMATED: CPT | Performed by: NURSE PRACTITIONER

## 2020-11-04 PROCEDURE — 85379 FIBRIN DEGRADATION QUANT: CPT | Performed by: HOSPITALIST

## 2020-11-04 PROCEDURE — 84145 PROCALCITONIN (PCT): CPT | Performed by: HOSPITALIST

## 2020-11-04 PROCEDURE — 25010000002 ENOXAPARIN PER 10 MG: Performed by: NURSE PRACTITIONER

## 2020-11-04 PROCEDURE — 25010000002 CEFTRIAXONE PER 250 MG: Performed by: NURSE PRACTITIONER

## 2020-11-04 PROCEDURE — 82728 ASSAY OF FERRITIN: CPT | Performed by: HOSPITALIST

## 2020-11-04 PROCEDURE — 93010 ELECTROCARDIOGRAM REPORT: CPT | Performed by: INTERNAL MEDICINE

## 2020-11-04 PROCEDURE — 80048 BASIC METABOLIC PNL TOTAL CA: CPT | Performed by: NURSE PRACTITIONER

## 2020-11-04 PROCEDURE — XW033E5 INTRODUCTION OF REMDESIVIR ANTI-INFECTIVE INTO PERIPHERAL VEIN, PERCUTANEOUS APPROACH, NEW TECHNOLOGY GROUP 5: ICD-10-PCS | Performed by: HOSPITALIST

## 2020-11-04 PROCEDURE — 83615 LACTATE (LD) (LDH) ENZYME: CPT | Performed by: HOSPITALIST

## 2020-11-04 RX ORDER — BISACODYL 10 MG
10 SUPPOSITORY, RECTAL RECTAL DAILY PRN
Status: DISCONTINUED | OUTPATIENT
Start: 2020-11-04 | End: 2020-11-09 | Stop reason: HOSPADM

## 2020-11-04 RX ORDER — ONDANSETRON 2 MG/ML
4 INJECTION INTRAMUSCULAR; INTRAVENOUS EVERY 6 HOURS PRN
Status: DISCONTINUED | OUTPATIENT
Start: 2020-11-04 | End: 2020-11-09 | Stop reason: HOSPADM

## 2020-11-04 RX ORDER — ACETAMINOPHEN 160 MG/5ML
650 SOLUTION ORAL EVERY 4 HOURS PRN
Status: DISCONTINUED | OUTPATIENT
Start: 2020-11-04 | End: 2020-11-09 | Stop reason: HOSPADM

## 2020-11-04 RX ORDER — DEXAMETHASONE SODIUM PHOSPHATE 4 MG/ML
6 INJECTION, SOLUTION INTRA-ARTICULAR; INTRALESIONAL; INTRAMUSCULAR; INTRAVENOUS; SOFT TISSUE DAILY
Status: DISCONTINUED | OUTPATIENT
Start: 2020-11-05 | End: 2020-11-04

## 2020-11-04 RX ORDER — SODIUM CHLORIDE 9 MG/ML
75 INJECTION, SOLUTION INTRAVENOUS CONTINUOUS
Status: DISCONTINUED | OUTPATIENT
Start: 2020-11-04 | End: 2020-11-04

## 2020-11-04 RX ORDER — PANTOPRAZOLE SODIUM 40 MG/1
40 TABLET, DELAYED RELEASE ORAL EVERY MORNING
Status: DISCONTINUED | OUTPATIENT
Start: 2020-11-05 | End: 2020-11-05

## 2020-11-04 RX ORDER — CALCIUM CARBONATE 200(500)MG
2 TABLET,CHEWABLE ORAL 2 TIMES DAILY PRN
Status: DISCONTINUED | OUTPATIENT
Start: 2020-11-04 | End: 2020-11-09 | Stop reason: HOSPADM

## 2020-11-04 RX ORDER — B-COMPLEX WITH VITAMIN C
50 TABLET ORAL DAILY
COMMUNITY

## 2020-11-04 RX ORDER — SODIUM CHLORIDE 0.9 % (FLUSH) 0.9 %
10 SYRINGE (ML) INJECTION EVERY 12 HOURS SCHEDULED
Status: DISCONTINUED | OUTPATIENT
Start: 2020-11-04 | End: 2020-11-09 | Stop reason: HOSPADM

## 2020-11-04 RX ORDER — DIPHENHYDRAMINE HCL 25 MG
25 CAPSULE ORAL EVERY 8 HOURS PRN
Status: DISCONTINUED | OUTPATIENT
Start: 2020-11-04 | End: 2020-11-09 | Stop reason: HOSPADM

## 2020-11-04 RX ORDER — MENTHOL/ZINC OXIDE 0.44-20.6%
1 OINTMENT (GRAM) TOPICAL 2 TIMES DAILY
COMMUNITY

## 2020-11-04 RX ORDER — POLYETHYLENE GLYCOL 3350
17 GRANULES (GRAM) MISCELLANEOUS DAILY
COMMUNITY

## 2020-11-04 RX ORDER — CEFTRIAXONE SODIUM 1 G/50ML
1 INJECTION, SOLUTION INTRAVENOUS EVERY 24 HOURS
Status: DISCONTINUED | OUTPATIENT
Start: 2020-11-04 | End: 2020-11-05

## 2020-11-04 RX ORDER — BISACODYL 5 MG/1
5 TABLET, DELAYED RELEASE ORAL DAILY PRN
Status: DISCONTINUED | OUTPATIENT
Start: 2020-11-04 | End: 2020-11-09 | Stop reason: HOSPADM

## 2020-11-04 RX ORDER — ONDANSETRON 4 MG/1
4 TABLET, FILM COATED ORAL EVERY 6 HOURS PRN
Status: DISCONTINUED | OUTPATIENT
Start: 2020-11-04 | End: 2020-11-09 | Stop reason: HOSPADM

## 2020-11-04 RX ORDER — MELATONIN
5000 DAILY
Status: DISCONTINUED | OUTPATIENT
Start: 2020-11-04 | End: 2020-11-09 | Stop reason: HOSPADM

## 2020-11-04 RX ORDER — DEXAMETHASONE SODIUM PHOSPHATE 4 MG/ML
6 INJECTION, SOLUTION INTRA-ARTICULAR; INTRALESIONAL; INTRAMUSCULAR; INTRAVENOUS; SOFT TISSUE ONCE
Status: COMPLETED | OUTPATIENT
Start: 2020-11-04 | End: 2020-11-04

## 2020-11-04 RX ORDER — LOPERAMIDE HYDROCHLORIDE 2 MG/1
2 CAPSULE ORAL 4 TIMES DAILY PRN
Status: DISCONTINUED | OUTPATIENT
Start: 2020-11-04 | End: 2020-11-09 | Stop reason: HOSPADM

## 2020-11-04 RX ORDER — SODIUM CHLORIDE 0.9 % (FLUSH) 0.9 %
10 SYRINGE (ML) INJECTION AS NEEDED
Status: DISCONTINUED | OUTPATIENT
Start: 2020-11-04 | End: 2020-11-09 | Stop reason: HOSPADM

## 2020-11-04 RX ORDER — ACETAMINOPHEN 650 MG/1
650 SUPPOSITORY RECTAL EVERY 4 HOURS PRN
Status: DISCONTINUED | OUTPATIENT
Start: 2020-11-04 | End: 2020-11-09 | Stop reason: HOSPADM

## 2020-11-04 RX ORDER — NITROGLYCERIN 0.4 MG/1
0.4 TABLET SUBLINGUAL
Status: DISCONTINUED | OUTPATIENT
Start: 2020-11-04 | End: 2020-11-09 | Stop reason: HOSPADM

## 2020-11-04 RX ORDER — ACETAMINOPHEN 325 MG/1
650 TABLET ORAL EVERY 4 HOURS PRN
Status: DISCONTINUED | OUTPATIENT
Start: 2020-11-04 | End: 2020-11-09 | Stop reason: HOSPADM

## 2020-11-04 RX ORDER — ASCORBIC ACID 500 MG
500 TABLET ORAL DAILY
COMMUNITY

## 2020-11-04 RX ADMIN — Medication: at 20:51

## 2020-11-04 RX ADMIN — SODIUM CHLORIDE 75 ML/HR: 9 INJECTION, SOLUTION INTRAVENOUS at 02:47

## 2020-11-04 RX ADMIN — Medication 5000 UNITS: at 14:43

## 2020-11-04 RX ADMIN — REMDESIVIR 200 MG: 100 INJECTION, POWDER, LYOPHILIZED, FOR SOLUTION INTRAVENOUS at 14:43

## 2020-11-04 RX ADMIN — SODIUM CHLORIDE, PRESERVATIVE FREE 10 ML: 5 INJECTION INTRAVENOUS at 20:51

## 2020-11-04 RX ADMIN — CEFTRIAXONE SODIUM 1 G: 1 INJECTION, SOLUTION INTRAVENOUS at 02:44

## 2020-11-04 RX ADMIN — ENOXAPARIN SODIUM 40 MG: 40 INJECTION SUBCUTANEOUS at 08:10

## 2020-11-04 RX ADMIN — DEXAMETHASONE SODIUM PHOSPHATE 6 MG: 4 INJECTION, SOLUTION INTRAMUSCULAR; INTRAVENOUS at 02:43

## 2020-11-04 RX ADMIN — Medication: at 14:44

## 2020-11-04 NOTE — PLAN OF CARE
Goal Outcome Evaluation:  Plan of Care Reviewed With: patient  Progress: no change  Outcome Summary: Patient admitted overnight with PNA d/t COVID. Patient non verbal at baseline d/t hx of TBI. G-tube in place with scant amounts of tan drainage from site. Supra Pubic catheter in place with little output. Patient on 3L oxygen. IVF started. Will continue to monitor.    Goal: Patient-Specific Goal (Individualized)  Outcome: Ongoing, Progressing  Goal: Absence of Hospital-Acquired Illness or Injury  Outcome: Ongoing, Progressing  Intervention: Identify and Manage Fall Risk  Recent Flowsheet Documentation  Taken 11/4/2020 0420 by Ruma Blanton RN  Safety Promotion/Fall Prevention:   activity supervised   assistive device/personal items within reach   clutter free environment maintained   fall prevention program maintained   room organization consistent   safety round/check completed  Intervention: Prevent Skin Injury  Recent Flowsheet Documentation  Taken 11/4/2020 0420 by Ruma Blanton RN  Body Position:   turned   tilted, right  Goal: Optimal Comfort and Wellbeing  Outcome: Ongoing, Progressing  Intervention: Provide Person-Centered Care  Recent Flowsheet Documentation  Taken 11/4/2020 0420 by Ruma Blanton RN  Trust Relationship/Rapport:   care explained   reassurance provided  Goal: Readiness for Transition of Care  Outcome: Ongoing, Progressing  Intervention: Mutually Develop Transition Plan  Recent Flowsheet Documentation  Taken 11/4/2020 0443 by Ruma Blanton RN  Transportation Anticipated: health plan transportation  Patient/Family Anticipated Services at Transition:   Patient/Family Anticipates Transition to: long-term care facility  Taken 11/4/2020 0438 by Ruma Blanton RN  Equipment Currently Used at Home: hospital bed      Problem: Skin Injury Risk Increased  Goal: Skin Health and Integrity  Outcome: Ongoing, Progressing  Intervention: Optimize Skin Protection  Recent Flowsheet Documentation  Taken  11/4/2020 0420 by Ruma Blanton RN  Pressure Reduction Techniques:   heels elevated off bed   weight shift assistance provided  Head of Bed (HOB): HOB at 45 degrees  Skin Protection:   adhesive use limited   incontinence pads utilized     Problem: Fall Injury Risk  Goal: Absence of Fall and Fall-Related Injury  Outcome: Ongoing, Progressing  Intervention: Promote Injury-Free Environment  Recent Flowsheet Documentation  Taken 11/4/2020 0420 by Ruma Blanton RN  Safety Promotion/Fall Prevention:   activity supervised   assistive device/personal items within reach   clutter free environment maintained   fall prevention program maintained   room organization consistent   safety round/check completed     Problem: Infection  Goal: Infection Symptom Resolution  Outcome: Ongoing, Progressing     Problem: UTI (Urinary Tract Infection)  Goal: Improved Infection Symptoms  Outcome: Ongoing, Progressing

## 2020-11-04 NOTE — ED NOTES
Pt brief changed of moderate amount of black stool, pt's suprapubic catheter irrigated with 30 ml of sterile water using sterile technique per dr tesfaye verbal order, no return of urine, although easily when irrigating. md bernstein made aware of all. Pt repositioned and warm blankets and pillow provided for comfort, pt alert, rr labored.      Lisa Morales, RN  11/04/20 0138

## 2020-11-04 NOTE — CONSULTS
"Adult Nutrition  Assessment/PES    Patient Name:  Manoj Jordan  YOB: 1955  MRN: [id number]  Admit Date:  11/3/2020    Assessment Date:  11/4/2020    Comments:  Nutrition consult per nurse admission screen.  Patient admitted with COVID 19 PNA.  Patient is nonverbal at baseline.  PMH includes TBI, CVA, dysphagia, quadriplegia.  Patient with PEG in place.    Per paper chart from NH documentation, patient receives TFs of Nutren 1.5 overnight at 75 ml/hr + bolus of 275 mL at 2 pm daily.    Once TFs are okay to be resumed, recommend starting TFs of Nutren 1.5 at 8 pm at goal rate of 75 ml/hr - run from 8 pm to 9 am (13 hours).  Free water flushes of 45 ml q hr while TFs are running.  Also recommend bolus of 275 mL at 2 pm with 100 ml free water flush before and after.    RD to continue to follow closely.    Reason for Assessment     Row Name 11/04/20 1055          Reason for Assessment    Reason For Assessment  identified at risk by screening criteria;nurse/nurse practitioner consult     Diagnosis  neurologic conditions;psychosocial;gastrointestinal disease;substance use/abuse;other (see comments);pulmonary disease;infection/sepsis CVA, aphasia, anxiety, bowel obstruction, TBI, depression, dysphagia, GERD, heroin OD, pancreatitis, quadriplegia; adm with COVID 19 PNA     Identified At Risk by Screening Criteria  tube feeding or parenteral nutrition;MST SCORE 2+         Nutrition/Diet History     Row Name 11/04/20 1056          Nutrition/Diet History    Typical Food/Fluid Intake  patient with PEG, receives TFs of Nutren 1.5 at 75 ml/hr overnight until 1000 mL reached, also receives a bolus of 275 mL at 2 pm         Anthropometrics     Row Name 11/04/20 1058 11/04/20 0418       Anthropometrics    Height  165.1 cm (65\")  --    Weight  --  50.8 kg (112 lb)       Admit Weight    Admit Weight  -- 112# 11/4  --       Ideal Body Weight (IBW)    Ideal Body Weight (IBW) (kg)  62.51  --       Body Mass Index " "(BMI)    BMI Assessment  BMI 18.5-24.9: normal 18.60  --        Labs/Tests/Procedures/Meds     Row Name 11/04/20 1100          Labs/Procedures/Meds    Lab Results Reviewed  reviewed, pertinent     Lab Results Comments  Gluc, Na, BUN, Platelets        Diagnostic Tests/Procedures    Diagnostic Test/Procedure Reviewed  reviewed, pertinent        Medications    Pertinent Medications Reviewed  reviewed, pertinent     Pertinent Medications Comments  decadron, lovenox         Physical Findings     Row Name 11/04/20 1101          Physical Findings    Overall Physical Appearance  tetraplegia (quadriplegia);on oxygen therapy     Gastrointestinal  feeding tube     Tubes  PEG     Skin  -- B=13, intact         Estimated/Assessed Needs     Row Name 11/04/20 1101 11/04/20 1058       Calculation Measurements    Weight Used For Calculations  50.8 kg (111 lb 15.9 oz)  --    Height  --  165.1 cm (65\")       Estimated/Assessed Needs       KCAL/KG    KCAL/KG  30 Kcal/Kg (kcal);35 Kcal/Kg (kcal)  --    30 Kcal/Kg (kcal)  1524  --    35 Kcal/Kg (kcal)  1778  --       Protein Requirements    Weight Used For Protein Calculations  50.8 kg (111 lb 15.9 oz)  --    Est Protein Requirement Amount (gms/kg)  1.5 gm protein 61-76  --    Estimated Protein Requirements (gms/day)  76.2  --       Fluid Requirements    Fluid Requirements (mL/day)  1524  --        Nutrition Prescription Ordered     Row Name 11/04/20 1106          Nutrition Prescription PO    Current PO Diet  NPO         Problem/Interventions:  Problem 1     Row Name 11/04/20 1106          Nutrition Diagnoses Problem 1    Problem 1  Needs Alternate Route     Etiology (related to)  Medical Diagnosis     Neurological  TBI;Dysphagia     Signs/Symptoms (evidenced by)  Report/Observation         Intervention Goal     Row Name 11/04/20 1107          Intervention Goal    General  Maintain nutrition;Disease management/therapy;Meet nutritional needs for age/condition;Nutrition support treatment  "    TF/PN  Inititiate TF/PN;Establish TF tolerance;Tolerate TF at goal     Weight  Maintain weight         Nutrition Intervention     Row Name 11/04/20 1107          Nutrition Intervention    RD/Tech Action  Follow Tx progress;Care plan reviewd;Recommend/ordered     Recommended/Ordered  EN         Nutrition Prescription     Row Name 11/04/20 1107          Nutrition Prescription EN    Enteral Prescription  Enteral begin/change;Enteral to supply     Enteral Route  PEG     Product  Nutren 1.5 prakash     TF Delivery Method  Cyclic;Bolus     TF Bolus Goal Volume (mL)  275 mL     TF Bolus Frequency  Daily at 2 pm with 100 ml flush before and after     Cyclic TF Goal Volume (mL)  1000 mL     Cyclic TF Goal Rate (mL/hr)  75 mL/hr     Cyclic TF Cycle Over (hrs)   8 pm to 9 am     Water flush (mL)   45 mL     Water Flush Frequency  Per hour        EN to Supply    Kcal/Day  1876 Kcal/Day     Protein (gm/day)  85 gm/day     Meet Estimated Kcal Need (%)  100 %     Meet Estimated Protein Need (%)  100 %     TF Free H2O (mL)  950 mL     Total Free H2O (mL/day)  1735 mL/day         Education/Evaluation     Row Name 11/04/20 1110          Education    Education  Education not appropriate at this time     Please explain  Patient nonverbal        Monitor/Evaluation    Monitor  Per protocol;I&O;Pertinent labs;TF delivery/tolerance;Weight;Skin status;Symptoms           Electronically signed by:  Nikole Kwon RD  11/04/20 11:10 EST

## 2020-11-04 NOTE — ED NOTES
Pt noted to have gtube that is intact with no noted drainage or redness.   suprapubic catheter noted to be in place, slight redness noted around site. approx 10 ml of urine out of suprapubic catheter, large amount of sediment noted in catheter and tubing, dr bernstein made aware of all.      Lisa Morales, RN  11/04/20 0032

## 2020-11-04 NOTE — ED NOTES
Pt to ED from [facility] per [facility] with reports of being covid +, pt was dropping oxygen saturations at facility.  Pt is alert and has no complaints per EMS.      EMS had difficulty getting accurate saturations, but report RA sat of 92% with wheezes in BL upper lobes.      All triage performed with this RN wearing appropriate PPE.  Pt placed in mask upon arrival to ED.     Yanni Martins, RN  11/03/20 2694

## 2020-11-04 NOTE — PROGRESS NOTES
[facility]  Clinical Pharmacy Department     Remdesivir Review Note    Manoj Jordan is a 65 y.o. male with confirmed COVID-19 infection on day 2 of hospitalization.     Consulting Provider:  Dr Hansen  Date of Confirmed SARS-CoV-2: 11/4 (diagnosised PTA at NH)  Date of Symptom Onset: unknown  Planned Duration of Therapy: 5 days  Other Antimicrobials: ceftriaxone  Hydroxychloroquine or chloroquine prior to arrival: no    Allergies  Allergies as of 11/03/2020    (No Known Allergies)       Microbiology:  Microbiology Results (last 10 days)       Procedure Component Value - Date/Time    Respiratory Panel PCR w/COVID-19(SARS-CoV-2) ILDEFONSO/KELSIE/LIDA/PAD/COR/MAD/BASHIR In-House, NP Swab in UTM/VTM, 3-4 HR TAT - Swab, Nasopharynx [[id number]]  (Abnormal) Collected: 11/04/20 0300    Lab Status: Final result Specimen: Swab from Nasopharynx Updated: 11/04/20 0430     ADENOVIRUS, PCR Not Detected     Coronavirus 229E Not Detected     Coronavirus HKU1 Not Detected     Coronavirus NL63 Not Detected     Coronavirus OC43 Not Detected     COVID19 Detected     Human Metapneumovirus Not Detected     Human Rhinovirus/Enterovirus Not Detected     Influenza A PCR Not Detected     Influenza B PCR Not Detected     Parainfluenza Virus 1 Not Detected     Parainfluenza Virus 2 Not Detected     Parainfluenza Virus 3 Not Detected     Parainfluenza Virus 4 Not Detected     RSV, PCR Not Detected     Bordetella pertussis pcr Not Detected     Bordetella parapertussis PCR Not Detected     Chlamydophila pneumoniae PCR Not Detected     Mycoplasma pneumo by PCR Not Detected    Narrative:      Fact sheet for providers: https://docs.[employer] [place]/wp-content/uploads/AIX1662-7343-OK3.1-EUA-Provider-Fact-Sheet-3.pdf    Fact sheet for patients: https://docs.[employer] [place]/wp-content/uploads/NAJ8108-9421-XR2.1-EUA-Patient-Fact-Sheet-1.pdf            Radiology/Imaging:  CXR  - Bilateral pulmonary infiltrates    Vitals/Labs/I&O  Temp:  [97.5 °F (36.4 °C)-97.8  °F (36.6 °C)] 97.5 °F (36.4 °C)  Heart Rate:  [] 89  Resp:  [24-32] 32  BP: (111-170)/(76-99) 131/99    Results from last 7 days   Lab Units 11/04/20  0740 11/03/20  2342   WBC 10*3/mm3 4.15 5.05     Results from last 7 days   Lab Units 11/04/20  0740 11/03/20  2342   PROCALCITONIN ng/mL 0.18 0.19     Results from last 7 days   Lab Units 11/03/20  2342   AST (SGOT) U/L 53*      Results from last 7 days   Lab Units 11/03/20  2342   ALT (SGPT) U/L 34       Estimated Creatinine Clearance: 55.7 mL/min (by C-G formula based on SCr of 0.95 mg/dL).  Results from last 7 days   Lab Units 11/04/20  0740 11/03/20  2342   BUN mg/dL 36* 35*   CREATININE mg/dL 0.95 0.98     Intake & Output (last 3 days)         11/01 0701 - 11/02 0700 11/02 0701 - 11/03 0700 11/03 0701 - 11/04 0700 11/04 0701 - 11/05 0700    IV Piggyback   50     Total Intake(mL/kg)   50 (1)     Net   +50             Urine Unmeasured Occurrence    1 x    Stool Unmeasured Occurrence   1 x             Assessment/Plan:    Patient is hospitalized with confirmed, severe COVID-19 infection and started on remdesivir 200 mg IV once followed by 100 mg IV daily for 4 days (5 day total duration). All inclusions, exclusions, and monitoring requirements listed below have been reviewed.    Patient is hospitalized with confirmed COVID-19 infection  Patient is requiring ?2 L of oxygen to maintain oxygen saturations of ?94%   Baseline and daily LFTs and Scr have been ordered prior to remdesivir initiation  ALT is not ? 5 times the upper limit of normal  Patient is not on concomitant hydroxychloroquine or chloroquine       Thank you for involving pharmacy in this patient's care. Please contact pharmacy with any questions or concerns.                           Ron Abdul PharmD  Clinical Pharmacist  11/04/20 12:42 EST

## 2020-11-04 NOTE — PROGRESS NOTES
Discharge Planning Assessment  [facility]     Patient Name: Manoj Jordan  MRN: [id number]  Today's Date: 11/4/2020    Admit Date: 11/3/2020    Discharge Needs Assessment     Row Name 11/04/20 1537       Living Environment    Lives With  facility resident    Current Living Arrangements  extended care facility    Family Caregiver if Needed  sibling(s)    Family Caregiver Names  sister Reina Marte    Quality of Family Relationships  involved;supportive    Able to Return to Prior Arrangements  yes       Transition Planning    Patient/Family Anticipates Transition to  long-term care facility       Discharge Needs Assessment    Readmission Within the Last 30 Days  no previous admission in last 30 days    Provided Post Acute Provider List?  N/A    Provided Post Acute Provider Quality & Resource List?  N/A        Discharge Plan     Row Name 11/04/20 1545       Plan    Plan  Plan it return to [facility] at [facility] upon [place].    Plan Comments  Spoke with Kenisha Swanson - pt is froma LTC bed in [facility] at [facility] with a Medicaid bedhold. Pt can return upon DC.  CCP spoke with pt's sister Reina Ragsdale [phone]) who confirms plan to return to [facility] upon [place].  Packet started and placed in CCP office;        Continued Care and Services - Admitted Since 11/3/2020     Destination Coordination complete    Service Provider Request Status Selected Services Address Phone Fax    [facility]   Selected Intermediate Care [facility] [address] [phone]                Demographic Summary     Row Name 11/04/20 1531       General Information    Admission Type  inpatient    Arrived From  subacute/long-term acute care    Referral Source  admission list    Reason for Consult  discharge planning    Preferred Language  English        Functional Status     Row Name 11/04/20 1537       Functional Status, IADL    Medications  completely dependent    Meal  Preparation  completely dependent    Housekeeping  completely dependent    Laundry  completely dependent    Shopping  completely dependent       Employment/    Employment Status  retired                Reyna Morley RN

## 2020-11-04 NOTE — H&P
[facility]    Patient Identification:  Name: Manoj Jordan  Age: 65 y.o.  Sex: male  :  1955  MRN: [id number]         Primary Care Physician: Isma Cervantes MD    Chief Complaint   Patient presents with   • Shortness of Breath   • Covid +     Subjective   Patient is a 65 y.o. male history of CVA/TBI with aphasia recently diagnosed with COVID at NH. He has had increasing SOA and decline in mental status and hypoxia. History is difficult d/t aphasia. Patient currently expressing that his breathing is improved. he has a history of cardiac arrest d/t heroin OD in  and has a G tube (NPO), SP catheter. history obtained from chart and patient's sister    Past Medical History:   Diagnosis Date   • Acute embolism and thrombosis of other thoracic veins (CMS/HCC)    • Agitation    • Allergic rhinitis    • Anxiety    • Aphasia    • Bowel obstruction (CMS/HCC)    • Brain injury (CMS/HCC)    • Calculus of gallbladder and bile duct w/o cholecystitis or obstruction    • Depression    • Disorder of kidney and ureter    • Dysphagia    • GERD (gastroesophageal reflux disease)    • H/O cardiac arrest     DUE TO HEROIN OVERDOSE IN    • Heroin overdose (CMS/HCC)    • History of small bowel obstruction    • Other obstructive and reflux uropathy    • Pancreatitis    • Pneumonitis    • Pneumonitis due to inhalation of food or vomitus (CMS/HCC)    • Quadriplegia (CMS/HCC)    • Restless    • UTI (urinary tract infection)      Past Surgical History:   Procedure Laterality Date   • BRONCHOSCOPY N/A 2018    w/ washingDr. Socrates Nortoner   • BRONCHOSCOPY N/A 2019    Clemente Russell   • GASTROSTOMY FEEDING TUBE INSERTION     • INGUINAL HERNIA REPAIR Right 2020    Procedure: RIGHT INGUINAL HERNIA REPAIR WITH MESH;  Surgeon: Erasmo Vital Jr., MD;  Location: [facility] OR;  Service: General;  Laterality: Right;   • SUPRAPUBIC  CATHETER INSERTION       Current medications reviewed.    Family History   Family history unknown: Yes     Social History     Tobacco Use   • Smoking status: Former Smoker   • Smokeless tobacco: Never Used   Substance Use Topics   • Alcohol use: No   • Drug use: No     Review of Systems   Unable to perform ROS: Patient nonverbal     Objective      Vital Signs  Temp:  [97.5 °F (36.4 °C)-97.8 °F (36.6 °C)] 97.5 °F (36.4 °C)  Heart Rate:  [] 89  Resp:  [24-32] 32  BP: (111-170)/(76-99) 131/99  Body mass index is 18.64 kg/m².    Physical Exam  Vitals signs and nursing note reviewed.   Constitutional:       General: He is not in acute distress.     Appearance: He is cachectic. He is ill-appearing (chronic).      Comments: curled up on left side   Cardiovascular:      Rate and Rhythm: Normal rate and regular rhythm.   Pulmonary:      Effort: Tachypnea and respiratory distress (mild) present.      Breath sounds: Decreased breath sounds and rhonchi present.   Abdominal:      General: Bowel sounds are normal.      Palpations: Abdomen is soft.      Tenderness: There is no abdominal tenderness. There is no guarding or rebound.   Musculoskeletal:         General: Deformity present. No swelling.   Skin:     General: Skin is warm and dry.   Neurological:      Mental Status: He is alert.   Psychiatric:         Behavior: Behavior is not agitated or aggressive.       While in the room and during my examination of the patient I wore gloves, gown, mask, eye protection and followed enhanced droplet/contact isolation protocol and precautions.  I washed my hands before and after this patient encounter.     Results Review:  I reviewed the patient's new clinical results.  I reviewed the patient's new imaging results and agree with the interpretation.  I personally viewed and interpreted the patient's EKG/Telemetry data.    Lab Results   Component Value Date    ANIONGAP 6.9 11/04/2020     Estimated Creatinine Clearance: 55.7 mL/min  (by C-G formula based on SCr of 0.95 mg/dL).    Assessment/Plan   Active Hospital Problems    Diagnosis  POA   • **Pneumonia due to COVID-19 virus [U07.1, J12.89]  Yes   • Quadriplegia (CMS/HCC) [G82.50]  Unknown   • H/O cardiac arrest [Z86.74]  Not Applicable   • Brain injury (CMS/[facility]) [S06.9X9A]  Unknown      Resolved Hospital Problems   No resolved problems to display.     65 y.o. male with history of brain injury d/t cardiac arrest 2005, NH resident admitted with COVID 19 pneumonia.    COVID 19 pneumonia: continue supportive care, he is on supplemental oxygen and decadron. discussed with sister remdesivir risks/benefits and she agrees to proceed. monitor inflammatory markers:   The FDA has approved the product Remdesivir for treatment of laboratory confirmed COVID-19 in hospitalized patients. There is a Fact Sheet available for patients and/or caregivers for additional information on this therapy. The family has agreed to proceed.  · Lovenox for DVT proph (sister states no h/o GIB or head bleed).  · h/o g tube and SP cath (frequent UTIs)  · nutrition for tube feeds  · Sister states he has DNR at facility but wishes full code and support now  · discussed with family and nursing staff    Jaime Hansen MD  11/04/20  11:56 EST

## 2020-11-04 NOTE — DISCHARGE PLACEMENT REQUEST
"Steve Page (65 y.o. Male)     Date of Birth Social Security Number Address Home Phone MRN    1955  [address] [facility] [address] [phone] [id number]    Orthodox Marital Status          [employer]        Admission Date Admission Type Admitting Provider Attending Provider Department, Room/Bed    11/3/20 Emergency Jaime Hansen MD Nguyen, Minh Loc, MD [address], [address]/1    Discharge Date Discharge Disposition Discharge Destination                       Attending Provider: Jaime Hansen MD    Allergies: No Known Allergies    Isolation: Enh Drop/Con   Infection: COVID (confirmed) (11/04/20)   Code Status: CPR    Ht: 165.1 cm (65\")   Wt: 50.8 kg (112 lb)    Admission Cmt: None   Principal Problem: None                Active Insurance as of 11/3/2020     Primary Coverage     Payor Plan Insurance Group Employer/Plan Group    MEDICARE MEDICARE A & B      Payor Plan Address Payor Plan Phone Number Payor Plan Fax Number Effective Dates    [address] [phone]  9/1/2006 - None Entered    [facility] [address]       Subscriber Name Subscriber Birth Date Member ID       STEVE PAGE 1955 [id number]           Secondary Coverage     Payor Plan Insurance Group Employer/Plan Group    [place]      Payor Plan Address Payor Plan Phone Number Payor Plan Fax Number Effective Dates    [address] [phone]  9/8/2016 - None Entered    [place] [address]       Subscriber Name Subscriber Birth Date Member ID       STEVE PAGE 1955 [id number]                 Emergency Contacts      (Rel.) Home Phone Work Phone Mobile Phone    Reina Ragsdale (Sister) [phone] -- [phone]    BROCK PAGE (Sister) [phone] -- [phone]              "

## 2020-11-04 NOTE — ED NOTES
Pt noted to have urinary output from suprapubic catheter approx 20 ml at this time.      Lisa Morales, RN  11/04/20 0250

## 2020-11-04 NOTE — PROGRESS NOTES
"Pharmacy Consult - Lovenox    Pt Name: Manoj Jordan   Indication:  VTE Prophylaxis  Consulted by:  LOLIS Huizar    MRN: [id number]  : 1955  / Age: 65 y.o.  44.5 kg (98 lb)  Body mass index is 16.31 kg/m².    Relevant clinical data and objective history reviewed:  65 y.o. male 165.1 cm (65\") 44.5 kg (98 lb)    Home Anticoagulation:      Past Medical History:   Diagnosis Date   • Anxiety    • Brain injury (CMS/HCC)    • Depression    • GERD (gastroesophageal reflux disease)    • H/O cardiac arrest     DUE TO HEROIN OVERDOSE IN    • Heroin overdose (CMS/HCC)    • History of small bowel obstruction    • Pancreatitis    • Pneumonitis    • UTI (urinary tract infection)      has No Known Allergies.    Lab Results   Component Value Date    WBC 5.05 2020    HGB 13.7 2020    HCT 42.5 2020    MCV 91.6 2020     (L) 2020     Lab Results   Component Value Date    GLUCOSE 100 (H) 2020    CALCIUM 9.5 2020     (H) 2020    K 4.2 2020    CO2 31.1 (H) 2020     2020    BUN 35 (H) 2020    CREATININE 0.98 2020    EGFRIFNONA 77 2020    BCR 35.7 (H) 2020    ANIONGAP 7.9 2020       Estimated Creatinine Clearance: 47.3 mL/min (by C-G formula based on SCr of 0.98 mg/dL).    Active Inpatient Anticoagulation Orders:      Assessment/Plan    Will start patient on 40 mg subcutaneous every 24 hours, adjusted for renal function.       Pharmacy will discontinue the Pharmacy to Dose consult at this time. Renal function will continue to be monitored and dosing adjustments will be made by pharmacy based on renal function if necessary    Hubert Solorzano [facility]      "

## 2020-11-04 NOTE — ED PROVIDER NOTES
EMERGENCY DEPARTMENT ENCOUNTER    Room Number:  S516/1  Date of encounter:  11/4/2020  PCP: Isma Cervantes MD  Historian: EMS      HPI:  Chief Complaint: Shortness of breath, COVID-19  A complete HPI/ROS/PMH/PSH/SH/FH are unobtainable due to: Dementia    Context: Manoj Jordan is a 65 y.o. male who presents to the ED c/o shortness of breath and hypoxemia at the nursing home [facility].  Patient's aphasic from previous stroke and was diagnosed recently with COVID-19.  He has been increasingly short of breath, and they noticed that his mental status has been declining.  No other history is available at this time      PAST MEDICAL HISTORY  Active Ambulatory Problems     Diagnosis Date Noted   • Right inguinal hernia 01/23/2020     Resolved Ambulatory Problems     Diagnosis Date Noted   • No Resolved Ambulatory Problems     Past Medical History:   Diagnosis Date   • Acute embolism and thrombosis of other thoracic veins (CMS/HCC)    • Agitation    • Allergic rhinitis    • Anxiety    • Aphasia    • Bowel obstruction (CMS/HCC)    • Brain injury (CMS/HCC)    • Calculus of gallbladder and bile duct w/o cholecystitis or obstruction    • Depression    • Disorder of kidney and ureter    • Dysphagia    • GERD (gastroesophageal reflux disease)    • H/O cardiac arrest    • Heroin overdose (CMS/HCC)    • History of small bowel obstruction    • Other obstructive and reflux uropathy    • Pancreatitis    • Pneumonitis    • Pneumonitis due to inhalation of food or vomitus (CMS/HCC)    • Quadriplegia (CMS/HCC)    • Restless    • UTI (urinary tract infection)          PAST SURGICAL HISTORY  Past Surgical History:   Procedure Laterality Date   • BRONCHOSCOPY N/A 12/20/2018    w/ washing, Aria Engel   • BRONCHOSCOPY N/A 04/04/2019    Clemente Russell   • GASTROSTOMY FEEDING TUBE INSERTION     • INGUINAL HERNIA REPAIR Right 2/19/2020    Procedure: RIGHT INGUINAL HERNIA REPAIR WITH MESH;  Surgeon: Amanuel  Erasmo Lawrence MD;  Location: [facility] MAIN OR;  Service: General;  Laterality: Right;   • SUPRAPUBIC CATHETER INSERTION           FAMILY HISTORY  Family History   Family history unknown: Yes         SOCIAL HISTORY  Social History     Socioeconomic History   • Marital status:      Spouse name: Not on file   • Number of children: Not on file   • Years of education: Not on file   • Highest education level: Not on file   Tobacco Use   • Smoking status: Former Smoker   • Smokeless tobacco: Never Used   Substance and Sexual Activity   • Alcohol use: No   • Drug use: No   • Sexual activity: Defer         ALLERGIES  Patient has no known allergies.        REVIEW OF SYSTEMS  Review of Systems   Limited due to aphasia and stroke      PHYSICAL EXAM    I have reviewed the triage vital signs and nursing notes.    ED Triage Vitals [11/03/20 2250]   Temp Heart Rate Resp BP SpO2   -- 82 (!) 30 133/76 92 %      Temp src Heart Rate Source Patient Position BP Location FiO2 (%)   -- Monitor Sitting -- --       Physical Exam  GENERAL: Chronically ill-appearing, moderate respiratory distress  HENT: nares patent  EYES: no scleral icterus  CV: regular rhythm, regular rate  RESPIRATORY: Tachypneic, coarse rhonchi throughout  ABDOMEN: soft, patient has suprapubic catheter  MUSCULOSKELETAL: no deformity  SKIN: warm, dry        LAB RESULTS  Recent Results (from the past 24 hour(s))   ECG 12 Lead    Collection Time: 11/03/20 11:40 PM   Result Value Ref Range    QT Interval 408 ms   Comprehensive Metabolic Panel    Collection Time: 11/03/20 11:42 PM    Specimen: Blood   Result Value Ref Range    Glucose 100 (H) 65 - 99 mg/dL    BUN 35 (H) 8 - 23 mg/dL    Creatinine 0.98 0.76 - 1.27 mg/dL    Sodium 146 (H) 136 - 145 mmol/L    Potassium 4.2 3.5 - 5.2 mmol/L    Chloride 107 98 - 107 mmol/L    CO2 31.1 (H) 22.0 - 29.0 mmol/L    Calcium 9.5 8.6 - 10.5 mg/dL    Total Protein 7.3 6.0 - 8.5 g/dL    Albumin 3.20 (L) 3.50 - 5.20 g/dL    ALT (SGPT) 34  1 - 41 U/L    AST (SGOT) 53 (H) 1 - 40 U/L    Alkaline Phosphatase 79 39 - 117 U/L    Total Bilirubin 1.3 (H) 0.0 - 1.2 mg/dL    eGFR Non African Amer 77 >60 mL/min/1.73    Globulin 4.1 gm/dL    A/G Ratio 0.8 g/dL    BUN/Creatinine Ratio 35.7 (H) 7.0 - 25.0    Anion Gap 7.9 5.0 - 15.0 mmol/L   Lactic Acid, Plasma    Collection Time: 11/03/20 11:42 PM    Specimen: Blood   Result Value Ref Range    Lactate 1.3 0.5 - 2.0 mmol/L   Procalcitonin    Collection Time: 11/03/20 11:42 PM    Specimen: Blood   Result Value Ref Range    Procalcitonin 0.19 0.00 - 0.25 ng/mL   Troponin    Collection Time: 11/03/20 11:42 PM    Specimen: Blood   Result Value Ref Range    Troponin T <0.010 0.000 - 0.030 ng/mL   BNP    Collection Time: 11/03/20 11:42 PM    Specimen: Blood   Result Value Ref Range    proBNP 104.1 0.0 - 900.0 pg/mL   CBC Auto Differential    Collection Time: 11/03/20 11:42 PM    Specimen: Blood   Result Value Ref Range    WBC 5.05 3.40 - 10.80 10*3/mm3    RBC 4.64 4.14 - 5.80 10*6/mm3    Hemoglobin 13.7 13.0 - 17.7 g/dL    Hematocrit 42.5 37.5 - 51.0 %    MCV 91.6 79.0 - 97.0 fL    MCH 29.5 26.6 - 33.0 pg    MCHC 32.2 31.5 - 35.7 g/dL    RDW 15.2 12.3 - 15.4 %    RDW-SD 50.5 37.0 - 54.0 fl    MPV 11.9 6.0 - 12.0 fL    Platelets 119 (L) 140 - 450 10*3/mm3    Neutrophil % 68.7 42.7 - 76.0 %    Lymphocyte % 19.2 (L) 19.6 - 45.3 %    Monocyte % 10.9 5.0 - 12.0 %    Eosinophil % 0.2 (L) 0.3 - 6.2 %    Basophil % 0.4 0.0 - 1.5 %    Immature Grans % 0.6 (H) 0.0 - 0.5 %    Neutrophils, Absolute 3.47 1.70 - 7.00 10*3/mm3    Lymphocytes, Absolute 0.97 0.70 - 3.10 10*3/mm3    Monocytes, Absolute 0.55 0.10 - 0.90 10*3/mm3    Eosinophils, Absolute 0.01 0.00 - 0.40 10*3/mm3    Basophils, Absolute 0.02 0.00 - 0.20 10*3/mm3    Immature Grans, Absolute 0.03 0.00 - 0.05 10*3/mm3    nRBC 0.0 0.0 - 0.2 /100 WBC   Urinalysis With Microscopic If Indicated (No Culture) - Urine, Catheter    Collection Time: 11/03/20 11:43 PM    Specimen:  Urine, Catheter   Result Value Ref Range    Color, UA Dark Yellow (A) Yellow, Straw    Appearance, UA Turbid (A) Clear    pH, UA >=9.0 (H) 5.0 - 8.0    Specific Gravity, UA 1.018 1.005 - 1.030    Glucose,  mg/dL (1+) (A) Negative    Ketones, UA Negative Negative    Bilirubin, UA Negative Negative    Blood, UA Moderate (2+) (A) Negative    Protein, UA >=300 mg/dL (3+) (A) Negative    Leuk Esterase, UA Large (3+) (A) Negative    Nitrite, UA Negative Negative    Urobilinogen, UA 1.0 E.U./dL 0.2 - 1.0 E.U./dL   Urinalysis, Microscopic Only - Urine, Catheter    Collection Time: 11/03/20 11:43 PM    Specimen: Urine, Catheter   Result Value Ref Range    RBC, UA 0-2 None Seen, 0-2 /HPF    WBC, UA 13-20 (A) None Seen, 0-2 /HPF    Bacteria, UA 3+ (A) None Seen /HPF    Squamous Epithelial Cells, UA None Seen None Seen, 0-2 /HPF    Hyaline Casts, UA None Seen None Seen /LPF    Triple Phosphate Crystals, UA Moderate/2+ None Seen /HPF    Amorphous Urate Crystals, UA Small/1+ None Seen /HPF    Methodology Manual Light Microscopy    Respiratory Panel PCR w/COVID-19(SARS-CoV-2) ILDEFONSO/KELSIE/LIDA/PAD/COR/MAD/BASHIR In-House, NP Swab in UTM/VTM, 3-4 HR TAT - Swab, Nasopharynx    Collection Time: 11/04/20  3:00 AM    Specimen: Nasopharynx; Swab   Result Value Ref Range    ADENOVIRUS, PCR Not Detected Not Detected    Coronavirus 229E Not Detected Not Detected    Coronavirus HKU1 Not Detected Not Detected    Coronavirus NL63 Not Detected Not Detected    Coronavirus OC43 Not Detected Not Detected    COVID19 Detected (C) Not Detected - Ref. Range    Human Metapneumovirus Not Detected Not Detected    Human Rhinovirus/Enterovirus Not Detected Not Detected    Influenza A PCR Not Detected Not Detected    Influenza B PCR Not Detected Not Detected    Parainfluenza Virus 1 Not Detected Not Detected    Parainfluenza Virus 2 Not Detected Not Detected    Parainfluenza Virus 3 Not Detected Not Detected    Parainfluenza Virus 4 Not Detected Not Detected     RSV, PCR Not Detected Not Detected    Bordetella pertussis pcr Not Detected Not Detected    Bordetella parapertussis PCR Not Detected Not Detected    Chlamydophila pneumoniae PCR Not Detected Not Detected    Mycoplasma pneumo by PCR Not Detected Not Detected       Ordered the above labs and independently reviewed the results.        RADIOLOGY  Xr Chest 1 View    Result Date: 11/4/2020  SINGLE VIEW CHEST  HISTORY: Shortness of air. Rule out COVID 19.  COMPARISON: None available.  FINDINGS: Heart size is within normal limits. Bilateral infiltrates are seen. These are in an upper lobe predominant distribution. No pneumothorax or pleural effusion is seen      Bilateral pulmonary infiltrates.  This report was finalized on 11/4/2020 12:09 AM by Dr. Mariely Peña M.D.        I ordered the above noted radiological studies. Reviewed by me and discussed with radiologist.  See dictation for official radiology interpretation.      PROCEDURES    Procedures      MEDICATIONS GIVEN IN ER    Medications   sodium chloride 0.9 % flush 10 mL (has no administration in time range)   sodium chloride 0.9 % flush 10 mL ( Intravenous Canceled Entry 11/4/20 0418)   sodium chloride 0.9 % flush 10 mL (has no administration in time range)   nitroglycerin (NITROSTAT) SL tablet 0.4 mg (has no administration in time range)   sodium chloride 0.9 % infusion (75 mL/hr Intravenous New Bag 11/4/20 0247)   acetaminophen (TYLENOL) tablet 650 mg (has no administration in time range)     Or   acetaminophen (TYLENOL) 160 MG/5ML solution 650 mg (has no administration in time range)     Or   acetaminophen (TYLENOL) suppository 650 mg (has no administration in time range)   bisacodyl (DULCOLAX) EC tablet 5 mg (has no administration in time range)   ondansetron (ZOFRAN) tablet 4 mg (has no administration in time range)     Or   ondansetron (ZOFRAN) injection 4 mg (has no administration in time range)   calcium carbonate (TUMS) chewable tablet 500 mg (200  mg elemental) (has no administration in time range)   cefTRIAXone (ROCEPHIN) IVPB 1 g (0 g Intravenous Stopped 11/4/20 0332)   dexamethasone sodium phosphate injection 6 mg (has no administration in time range)   enoxaparin (LOVENOX) syringe 40 mg (has no administration in time range)   dexamethasone sodium phosphate injection 6 mg (6 mg Intravenous Given 11/4/20 0243)         PROGRESS, DATA ANALYSIS, CONSULTS, AND MEDICAL DECISION MAKING    All labs have been independently reviewed by me.  All radiology studies have been reviewed by me and discussed with radiologist dictating the report.   EKG's independently viewed and interpreted by me.  Discussion below represents my analysis of pertinent findings related to patient's condition, differential diagnosis, treatment plan and final disposition.        ED Course as of Nov 04 0747   Wed Nov 04, 2020 0746 CBC unremarkable, chemistry shows mild volume depletion    [DP]   0746 Urine from the catheter was sedimentary with leukocytes and bacteria but likely colonized    [DP]   0746 Opponent and procalcitonin normal    [DP]   0746 COVID-19 positive    [DP]   0746 Chest x-ray shows bilateral interstitial infiltrates consistent with COVID-19    [DP]   0746 Patient is requiring 2 L of oxygen by nasal cannula    [DP]   0747 Discussed case with nurse practitioner from [facility] who agrees to admit the patient to a telemetry bed under the care of Dr. See.  We have agreed to give him a dose of IV Decadron for his hypoxic failure secondary to Covid    [DP]      ED Course User Index  [DP] Latrell Leyva MD           PPE: Both the patient and I wore a surgical mask throughout the entire patient encounter. I wore protective goggles, gown and gloves    AS OF 07:47 EST VITALS:    BP - 131/99  HR - 89  TEMP - 97.5 °F (36.4 °C) (Oral)  O2 SATS - 95%        DIAGNOSIS  Final diagnoses:   Pneumonia due to COVID-19 virus         DISPOSITION  Admit to telemetry           Latrell Leyva,  MD  11/04/20 0747

## 2020-11-05 PROBLEM — D69.6 THROMBOCYTOPENIA, UNSPECIFIED (HCC): Status: ACTIVE | Noted: 2020-11-05

## 2020-11-05 PROBLEM — E87.0 HYPERNATREMIA: Status: ACTIVE | Noted: 2020-11-05

## 2020-11-05 LAB
ALBUMIN SERPL-MCNC: 3.2 G/DL (ref 3.5–5.2)
ALBUMIN/GLOB SERPL: 0.8 G/DL
ALP SERPL-CCNC: 76 U/L (ref 39–117)
ALT SERPL W P-5'-P-CCNC: 35 U/L (ref 1–41)
ANION GAP SERPL CALCULATED.3IONS-SCNC: 9.4 MMOL/L (ref 5–15)
AST SERPL-CCNC: 50 U/L (ref 1–40)
BACTERIA SPEC AEROBE CULT: NORMAL
BASOPHILS # BLD AUTO: 0.01 10*3/MM3 (ref 0–0.2)
BASOPHILS NFR BLD AUTO: 0.2 % (ref 0–1.5)
BILIRUB CONJ SERPL-MCNC: 0.2 MG/DL (ref 0–0.3)
BILIRUB SERPL-MCNC: 0.6 MG/DL (ref 0–1.2)
BUN SERPL-MCNC: 46 MG/DL (ref 8–23)
BUN/CREAT SERPL: 45.1 (ref 7–25)
CALCIUM SPEC-SCNC: 9.2 MG/DL (ref 8.6–10.5)
CHLORIDE SERPL-SCNC: 114 MMOL/L (ref 98–107)
CK SERPL-CCNC: 196 U/L (ref 20–200)
CO2 SERPL-SCNC: 26.6 MMOL/L (ref 22–29)
CREAT SERPL-MCNC: 1.02 MG/DL (ref 0.76–1.27)
CRP SERPL-MCNC: 6.41 MG/DL (ref 0–0.5)
D DIMER PPP FEU-MCNC: 6.99 MCGFEU/ML (ref 0–0.49)
DEPRECATED RDW RBC AUTO: 48.5 FL (ref 37–54)
EOSINOPHIL # BLD AUTO: 0 10*3/MM3 (ref 0–0.4)
EOSINOPHIL NFR BLD AUTO: 0 % (ref 0.3–6.2)
ERYTHROCYTE [DISTWIDTH] IN BLOOD BY AUTOMATED COUNT: 14.5 % (ref 12.3–15.4)
FERRITIN SERPL-MCNC: 439 NG/ML (ref 30–400)
FIBRINOGEN PPP-MCNC: 647 MG/DL (ref 219–464)
GFR SERPL CREATININE-BSD FRML MDRD: 73 ML/MIN/1.73
GLOBULIN UR ELPH-MCNC: 3.8 GM/DL
GLUCOSE SERPL-MCNC: 142 MG/DL (ref 65–99)
HCT VFR BLD AUTO: 39.7 % (ref 37.5–51)
HGB BLD-MCNC: 12.9 G/DL (ref 13–17.7)
LDH SERPL-CCNC: 271 U/L (ref 135–225)
LYMPHOCYTES # BLD AUTO: 0.89 10*3/MM3 (ref 0.7–3.1)
LYMPHOCYTES NFR BLD AUTO: 13.7 % (ref 19.6–45.3)
MCH RBC QN AUTO: 29.6 PG (ref 26.6–33)
MCHC RBC AUTO-ENTMCNC: 32.5 G/DL (ref 31.5–35.7)
MCV RBC AUTO: 91.1 FL (ref 79–97)
MONOCYTES # BLD AUTO: 0.66 10*3/MM3 (ref 0.1–0.9)
MONOCYTES NFR BLD AUTO: 10.1 % (ref 5–12)
NEUTROPHILS NFR BLD AUTO: 4.92 10*3/MM3 (ref 1.7–7)
NEUTROPHILS NFR BLD AUTO: 75.4 % (ref 42.7–76)
PLATELET # BLD AUTO: 139 10*3/MM3 (ref 140–450)
PMV BLD AUTO: 11.6 FL (ref 6–12)
POTASSIUM SERPL-SCNC: 4.7 MMOL/L (ref 3.5–5.2)
PROT SERPL-MCNC: 7 G/DL (ref 6–8.5)
RBC # BLD AUTO: 4.36 10*6/MM3 (ref 4.14–5.8)
SODIUM SERPL-SCNC: 150 MMOL/L (ref 136–145)
WBC # BLD AUTO: 6.52 10*3/MM3 (ref 3.4–10.8)

## 2020-11-05 PROCEDURE — 25010000002 CEFTRIAXONE PER 250 MG: Performed by: NURSE PRACTITIONER

## 2020-11-05 PROCEDURE — 85384 FIBRINOGEN ACTIVITY: CPT | Performed by: HOSPITALIST

## 2020-11-05 PROCEDURE — 82550 ASSAY OF CK (CPK): CPT | Performed by: HOSPITALIST

## 2020-11-05 PROCEDURE — 85379 FIBRIN DEGRADATION QUANT: CPT | Performed by: HOSPITALIST

## 2020-11-05 PROCEDURE — 82728 ASSAY OF FERRITIN: CPT | Performed by: HOSPITALIST

## 2020-11-05 PROCEDURE — 83615 LACTATE (LD) (LDH) ENZYME: CPT | Performed by: HOSPITALIST

## 2020-11-05 PROCEDURE — 85025 COMPLETE CBC W/AUTO DIFF WBC: CPT | Performed by: HOSPITALIST

## 2020-11-05 PROCEDURE — 80053 COMPREHEN METABOLIC PANEL: CPT | Performed by: HOSPITALIST

## 2020-11-05 PROCEDURE — 86140 C-REACTIVE PROTEIN: CPT | Performed by: HOSPITALIST

## 2020-11-05 PROCEDURE — 82248 BILIRUBIN DIRECT: CPT | Performed by: HOSPITALIST

## 2020-11-05 PROCEDURE — 25010000002 ENOXAPARIN PER 10 MG: Performed by: NURSE PRACTITIONER

## 2020-11-05 PROCEDURE — 63710000001 DEXAMETHASONE PER 0.25 MG: Performed by: HOSPITALIST

## 2020-11-05 RX ORDER — DEXTROSE MONOHYDRATE 50 MG/ML
50 INJECTION, SOLUTION INTRAVENOUS CONTINUOUS
Status: DISCONTINUED | OUTPATIENT
Start: 2020-11-05 | End: 2020-11-09 | Stop reason: HOSPADM

## 2020-11-05 RX ORDER — PANTOPRAZOLE SODIUM 40 MG/10ML
40 INJECTION, POWDER, LYOPHILIZED, FOR SOLUTION INTRAVENOUS
Status: DISCONTINUED | OUTPATIENT
Start: 2020-11-05 | End: 2020-11-08

## 2020-11-05 RX ADMIN — Medication: at 20:15

## 2020-11-05 RX ADMIN — DEXTROSE MONOHYDRATE 50 ML/HR: 50 INJECTION, SOLUTION INTRAVENOUS at 12:50

## 2020-11-05 RX ADMIN — PANTOPRAZOLE SODIUM 40 MG: 40 INJECTION, POWDER, FOR SOLUTION INTRAVENOUS at 12:50

## 2020-11-05 RX ADMIN — SODIUM CHLORIDE, PRESERVATIVE FREE 10 ML: 5 INJECTION INTRAVENOUS at 20:15

## 2020-11-05 RX ADMIN — Medication 5000 UNITS: at 09:52

## 2020-11-05 RX ADMIN — CEFTRIAXONE SODIUM 1 G: 1 INJECTION, SOLUTION INTRAVENOUS at 02:22

## 2020-11-05 RX ADMIN — ENOXAPARIN SODIUM 40 MG: 40 INJECTION SUBCUTANEOUS at 09:52

## 2020-11-05 RX ADMIN — SODIUM CHLORIDE, PRESERVATIVE FREE 10 ML: 5 INJECTION INTRAVENOUS at 12:51

## 2020-11-05 RX ADMIN — DEXAMETHASONE 6 MG: 4 TABLET ORAL at 09:52

## 2020-11-05 RX ADMIN — REMDESIVIR 100 MG: 100 INJECTION, POWDER, LYOPHILIZED, FOR SOLUTION INTRAVENOUS at 12:50

## 2020-11-05 RX ADMIN — Medication: at 09:53

## 2020-11-05 NOTE — PLAN OF CARE
Goal Outcome Evaluation:  Plan of Care Reviewed With: patient  Progress: no change  Outcome Summary: Alert, non-verbal. Shakes/nods head to answer questions. Denied pain or discomfort. On 2L nasal cannula, tachypneic. Tenacious secretions, patient tolerated suctioning intermittently. Started remdesivir today.

## 2020-11-05 NOTE — PLAN OF CARE
"Goal Outcome Evaluation:  Plan of Care Reviewed With: patient  Progress: no change     VSS, no signs or symptoms of pain. Decreased O2 to RA at 1830, maintaining sats. Superpubic cath in place-chronic leaking. Urine output charting occurrence as well as cath output. Tube feeding to be restarted at 2000 at 55 cc/hr - goal rate 75cc/hr. Pt communicating somewhat effectively with appropriate \"yes and nos\". Discharge probable over the weekend. Plans to return to [facility].   "

## 2020-11-05 NOTE — PROGRESS NOTES
Name: Manoj Jordan ADMIT: 11/3/2020   : 1955  PCP: Isma Cervantes MD    MRN: [id number] LOS: 1 days   AGE/SEX: 65 y.o. male  ROOM: [facility]     Subjective   Subjective   Nonverbal but he was able to express that he was not having cough, shortness of breath, pain.    Review of Systems   Unable to perform ROS: Patient nonverbal      Objective   Objective   Vital Signs  Temp:  [97.1 °F (36.2 °C)-97.9 °F (36.6 °C)] 97.1 °F (36.2 °C)  Heart Rate:  [86-99] 86  Resp:  [26-30] 26  BP: (116-132)/(61-91) 120/91  SpO2:  [95 %-100 %] 97 %  on  Flow (L/min):  [2-3] 2;   Device (Oxygen Therapy): nasal cannula  Body mass index is 18.64 kg/m².    Physical Exam  Vitals signs and nursing note reviewed.   Constitutional:       General: He is not in acute distress.     Appearance: He is cachectic. He is ill-appearing (chronic).   Cardiovascular:      Rate and Rhythm: Normal rate and regular rhythm.   Pulmonary:      Effort: Pulmonary effort is normal. No respiratory distress.      Breath sounds: Decreased breath sounds present.   Abdominal:      General: Bowel sounds are normal.      Palpations: Abdomen is soft.      Tenderness: There is no abdominal tenderness. There is no guarding or rebound.   Musculoskeletal:         General: No swelling.   Skin:     General: Skin is warm and dry.   Neurological:      Mental Status: He is alert.   Psychiatric:         Behavior: Behavior normal.     Results Review  I reviewed the patient's new clinical results.  Results from last 7 days   Lab Units 20  0729 20  0740 20  2342   WBC 10*3/mm3 6.52 4.15 5.05   HEMOGLOBIN g/dL 12.9* 13.2 13.7   PLATELETS 10*3/mm3 139* 118* 119*     Results from last 7 days   Lab Units 20  0729 20  1343 20  0740 20  2342   SODIUM mmol/L 150*  --  147* 146*   POTASSIUM mmol/L 4.7  --  4.8 4.2   CHLORIDE mmol/L 114*  --  110* 107   CO2 mmol/L 26.6  --  30.1* 31.1*   BUN mg/dL 46*  --  36* 35*   CREATININE mg/dL  1.02 1.04 0.95 0.98   GLUCOSE mg/dL 142*  --  124* 100*     Estimated Creatinine Clearance: 51.9 mL/min (by C-G formula based on SCr of 1.02 mg/dL).    Results from last 7 days   Lab Units 11/05/20  0729 11/04/20  1343 11/03/20  2342   ALBUMIN g/dL 3.20* 3.20* 3.20*   BILIRUBIN mg/dL 0.6 0.9 1.3*   ALK PHOS U/L 76 76 79   AST (SGOT) U/L 50* 47* 53*   ALT (SGPT) U/L 35 34 34     Results from last 7 days   Lab Units 11/05/20  0729 11/04/20  1343 11/04/20  0740 11/03/20  2342   CALCIUM mg/dL 9.2  --  9.3 9.5   ALBUMIN g/dL 3.20* 3.20*  --  3.20*     Results from last 7 days   Lab Units 11/04/20  0740 11/03/20  2342   PROCALCITONIN ng/mL 0.18 0.19   LACTATE mmol/L  --  1.3     COVID19   Date Value Ref Range Status   11/04/2020 Detected (C) Not Detected - Ref. Range Final       Scheduled Meds  cefTRIAXone, 1 g, Intravenous, Q24H  cholecalciferol, 5,000 Units, Oral, Daily  dexamethasone, 6 mg, Per G Tube, Daily With Breakfast  enoxaparin, 40 mg, Subcutaneous, Q24H  Menthol-Zinc Oxide, , Topical, BID  pantoprazole, 40 mg, Intravenous, Q AM  remdesivir, 100 mg, Intravenous, Q24H  sodium chloride, 10 mL, Intravenous, Q12H    Continuous Infusions  Pharmacy Consult - Remdesivir,     PRN Meds  •  acetaminophen **OR** acetaminophen **OR** acetaminophen  •  bisacodyl  •  bisacodyl  •  calcium carbonate  •  diphenhydrAMINE  •  Glycerin-Hypromellose-  •  loperamide  •  nitroglycerin  •  ondansetron **OR** ondansetron  •  Pharmacy Consult - Remdesivir  •  [COMPLETED] Insert peripheral IV **AND** sodium chloride  •  sodium chloride    Pharmacy Consult - Remdesivir,     Diet  NPO Diet     Assessment/Plan     Active Hospital Problems    Diagnosis  POA   • **Pneumonia due to COVID-19 virus [U07.1, J12.89]  Yes   • Hypernatremia [E87.0]  Unknown   • Thrombocytopenia, unspecified (CMS/HCC) [D69.6]  Unknown   • Quadriplegia (CMS/HCC) [G82.50]  Unknown   • H/O cardiac arrest [Z86.74]  Not Applicable   • Brain injury (CMS/HCC)  [S06.9X9A]  Unknown      Resolved Hospital Problems   No resolved problems to display.     65 y.o. 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  Decadron, remdesivir.  Patient is on 2 L nasal cannula currently.  No fever.  · Hypernatremia: D5 W  · Thrombocytopenia improved continue to monitor  · Patient was started on antibiotics for UTI.  However no signs/symptoms to suggest UTI in patient with chronic suprapubic catheter.  Culture growing mixed  · Lovenox 40 mg SC daily for DVT prophylaxis.  · Full code.  · Discussed with nursing staff and care team on multidisciplinary rounds.  · Anticipate discharge TBD.  timing yet to be determined.    Jaime Hansen MD  [facility]  11/05/20  12:18 EST    I wore protective equipment throughout this patient encounter including a face mask, gloves and protective eyewear.  Hand hygiene was performed before donning protective equipment and after removal when leaving the room.

## 2020-11-06 LAB
ALBUMIN SERPL-MCNC: 3.1 G/DL (ref 3.5–5.2)
ALBUMIN/GLOB SERPL: 0.8 G/DL
ALP SERPL-CCNC: 72 U/L (ref 39–117)
ALT SERPL W P-5'-P-CCNC: 35 U/L (ref 1–41)
ANION GAP SERPL CALCULATED.3IONS-SCNC: 9 MMOL/L (ref 5–15)
AST SERPL-CCNC: 43 U/L (ref 1–40)
BASOPHILS # BLD AUTO: 0.01 10*3/MM3 (ref 0–0.2)
BASOPHILS NFR BLD AUTO: 0.2 % (ref 0–1.5)
BILIRUB CONJ SERPL-MCNC: 0.2 MG/DL (ref 0–0.3)
BILIRUB SERPL-MCNC: 0.7 MG/DL (ref 0–1.2)
BUN SERPL-MCNC: 49 MG/DL (ref 8–23)
BUN/CREAT SERPL: 47.6 (ref 7–25)
CALCIUM SPEC-SCNC: 9.2 MG/DL (ref 8.6–10.5)
CHLORIDE SERPL-SCNC: 110 MMOL/L (ref 98–107)
CK SERPL-CCNC: 86 U/L (ref 20–200)
CO2 SERPL-SCNC: 27 MMOL/L (ref 22–29)
CREAT SERPL-MCNC: 1.03 MG/DL (ref 0.76–1.27)
CRP SERPL-MCNC: 3.44 MG/DL (ref 0–0.5)
DEPRECATED RDW RBC AUTO: 47.2 FL (ref 37–54)
EOSINOPHIL # BLD AUTO: 0 10*3/MM3 (ref 0–0.4)
EOSINOPHIL NFR BLD AUTO: 0 % (ref 0.3–6.2)
ERYTHROCYTE [DISTWIDTH] IN BLOOD BY AUTOMATED COUNT: 14.5 % (ref 12.3–15.4)
FERRITIN SERPL-MCNC: 392 NG/ML (ref 30–400)
GFR SERPL CREATININE-BSD FRML MDRD: 72 ML/MIN/1.73
GLOBULIN UR ELPH-MCNC: 3.7 GM/DL
GLUCOSE SERPL-MCNC: 191 MG/DL (ref 65–99)
HCT VFR BLD AUTO: 41.6 % (ref 37.5–51)
HGB BLD-MCNC: 13.7 G/DL (ref 13–17.7)
IMM GRANULOCYTES # BLD AUTO: 0.03 10*3/MM3 (ref 0–0.05)
IMM GRANULOCYTES NFR BLD AUTO: 0.5 % (ref 0–0.5)
LYMPHOCYTES # BLD AUTO: 0.67 10*3/MM3 (ref 0.7–3.1)
LYMPHOCYTES NFR BLD AUTO: 11.6 % (ref 19.6–45.3)
MCH RBC QN AUTO: 29.4 PG (ref 26.6–33)
MCHC RBC AUTO-ENTMCNC: 32.9 G/DL (ref 31.5–35.7)
MCV RBC AUTO: 89.3 FL (ref 79–97)
MONOCYTES # BLD AUTO: 0.56 10*3/MM3 (ref 0.1–0.9)
MONOCYTES NFR BLD AUTO: 9.7 % (ref 5–12)
NEUTROPHILS NFR BLD AUTO: 4.49 10*3/MM3 (ref 1.7–7)
NEUTROPHILS NFR BLD AUTO: 78 % (ref 42.7–76)
NRBC BLD AUTO-RTO: 0 /100 WBC (ref 0–0.2)
PLATELET # BLD AUTO: 145 10*3/MM3 (ref 140–450)
PMV BLD AUTO: 12.4 FL (ref 6–12)
POTASSIUM SERPL-SCNC: 4.5 MMOL/L (ref 3.5–5.2)
PROT SERPL-MCNC: 6.8 G/DL (ref 6–8.5)
RBC # BLD AUTO: 4.66 10*6/MM3 (ref 4.14–5.8)
SODIUM SERPL-SCNC: 146 MMOL/L (ref 136–145)
WBC # BLD AUTO: 5.76 10*3/MM3 (ref 3.4–10.8)

## 2020-11-06 PROCEDURE — 63710000001 DEXAMETHASONE PER 0.25 MG: Performed by: HOSPITALIST

## 2020-11-06 PROCEDURE — 82728 ASSAY OF FERRITIN: CPT | Performed by: HOSPITALIST

## 2020-11-06 PROCEDURE — 85025 COMPLETE CBC W/AUTO DIFF WBC: CPT | Performed by: HOSPITALIST

## 2020-11-06 PROCEDURE — 80053 COMPREHEN METABOLIC PANEL: CPT | Performed by: HOSPITALIST

## 2020-11-06 PROCEDURE — 86140 C-REACTIVE PROTEIN: CPT | Performed by: HOSPITALIST

## 2020-11-06 PROCEDURE — 25010000002 ENOXAPARIN PER 10 MG: Performed by: NURSE PRACTITIONER

## 2020-11-06 PROCEDURE — 82550 ASSAY OF CK (CPK): CPT | Performed by: HOSPITALIST

## 2020-11-06 PROCEDURE — 82248 BILIRUBIN DIRECT: CPT | Performed by: HOSPITALIST

## 2020-11-06 RX ADMIN — Medication: at 09:13

## 2020-11-06 RX ADMIN — DEXTROSE MONOHYDRATE 50 ML/HR: 50 INJECTION, SOLUTION INTRAVENOUS at 12:54

## 2020-11-06 RX ADMIN — Medication 5000 UNITS: at 09:11

## 2020-11-06 RX ADMIN — Medication: at 19:47

## 2020-11-06 RX ADMIN — PANTOPRAZOLE SODIUM 40 MG: 40 INJECTION, POWDER, FOR SOLUTION INTRAVENOUS at 05:49

## 2020-11-06 RX ADMIN — SODIUM CHLORIDE, PRESERVATIVE FREE 10 ML: 5 INJECTION INTRAVENOUS at 09:13

## 2020-11-06 RX ADMIN — ENOXAPARIN SODIUM 40 MG: 40 INJECTION SUBCUTANEOUS at 09:12

## 2020-11-06 RX ADMIN — DEXAMETHASONE 6 MG: 4 TABLET ORAL at 09:11

## 2020-11-06 RX ADMIN — REMDESIVIR 100 MG: 100 INJECTION, POWDER, LYOPHILIZED, FOR SOLUTION INTRAVENOUS at 12:54

## 2020-11-06 NOTE — PROGRESS NOTES
"Physicians Statement of Medical Necessity for  Ambulance Transportation    GENERAL INFORMATION     Name: Manoj Jordan  YOB: 1955  Medicare #: [id number]  Transport Date:    (Valid for round trips this date, or for scheduled repetitive trips for 60 days from the date signed below.)  Origin: [facility]    Destination:  [facility]  Is the Patient's stay covered under Medicare Part A (PPS/DRG?)Yes   Closest appropriate facility? Yes  If this a hosp-hosp transfer? No  Is this a hospice patient? No    MEDICAL NECESSITY QUESTIONAIRE    Ambulance Transportation is medically necessary only if other means of transportation are contraindicated or would be potentially harmful to the patient.  To meet this requirement, the patient must be either \"bed confined\" or suffer from a condition such that transport by means other than an ambulance is contraindicated by the patient's condition.  The following questions must be answered by the healthcare professional signing below for this form to be valid:     1) Describe the MEDICAL CONDITION (physical and/or mental) of this patient AT THE TIME OF AMBULANCE TRANSPORT that requires the patient to be transported in an ambulance, and why transport by other means is contraindicated by the patient's condition:   Past Medical History:   Diagnosis Date   • Acute embolism and thrombosis of other thoracic veins (CMS/HCC)    • Agitation    • Allergic rhinitis    • Anxiety    • Aphasia    • Bowel obstruction (CMS/HCC)    • Brain injury (CMS/HCC)    • Calculus of gallbladder and bile duct w/o cholecystitis or obstruction    • Depression    • Disorder of kidney and ureter    • Dysphagia    • GERD (gastroesophageal reflux disease)    • H/O cardiac arrest     DUE TO HEROIN OVERDOSE IN 2005   • Heroin overdose (CMS/HCC)    • History of small bowel obstruction    • Other obstructive and reflux uropathy    • Pancreatitis    • Pneumonitis    • Pneumonitis due to inhalation of food " "or vomitus (CMS/HCC)    • Quadriplegia (CMS/HCC)    • Restless    • UTI (urinary tract infection)       Past Surgical History:   Procedure Laterality Date   • BRONCHOSCOPY N/A 12/20/2018    w/ Dr. Socrates matos Nortoner   • BRONCHOSCOPY N/A 04/04/2019    Clemnete Russell   • GASTROSTOMY FEEDING TUBE INSERTION     • INGUINAL HERNIA REPAIR Right 2/19/2020    Procedure: RIGHT INGUINAL HERNIA REPAIR WITH MESH;  Surgeon: Erasmo Vital Jr., MD;  Location: [facility] OR;  Service: General;  Laterality: Right;   • SUPRAPUBIC CATHETER INSERTION        2) Is this patient \"bed confined\" as defined below?Yes    To be \"bed confined\" the patient must satisfy all three of the following criteria:  (1) unable to get up from bed without assistance; AND (2) unable to ambulate;  AND (3) unable to sit in a chair or wheelchair.  3) Can this patient safely be transported by car or wheelchair van (I.e., may safely sit during transport, without an attendant or monitoring?)No   4. In addition to completing questions 1-3 above, please check any of the following conditions that apply         Note: supporting documentation for any boxes checked must be maintained in the patient's medical records Patient is confused, Medical attendant required and Special handling/isolation/infection control precautions required      SIGNATURE OF PHYSICIAN OR OTHER AUTHORIZED HEALTHCARE PROFESSIONAL    I certify that the above information is true and correct based on my evaluation of this patient, and represent that the patient requires transport by ambulance and that other forms of transport are contraindicated.  I understand that this information will be used by the Centers for Medicare and Medicaid Services (CMS) to support the determiniation of medical necessity for ambulance services, and I represent that I have personal knowledge of the patient's condition at the time of transport.       If this box is checked, I also certify " that the patient is physically or mentally incapable of signing the ambulance service's claim form and that the institution with which I am affiliated has furnished care, services or assistance to the patient.  My signature below is made on behalf of the patient pursuant to 42 .36(b)(4). In accordance with 42 .37, the specific reason(s) that the patient is physically or mentally incapable of signing the claim for is as follows:     Signature of Physician or Healthcare Professional  Date/Time:        (For Scheduled repetitive transport, this form is not valid for transports performed more than 60 days after this date).                                                                                                                                            --------------------------------------------------------------------------------------------  Printed Name and Credentials of Physician or Authorized Healthcare Professional     *Form must be signed by patient's attending physician for scheduled, repetitive transports,.  For non-repetitive ambulance transports, if unable to obtain the signature of the attending physician, any of the following may sign (please select below):     Physician  Clinical Nurse Specialist  Registered Nurse     Physician Assistant  Discharge Planner  Licensed Practical Nurse     Nurse Practitioner

## 2020-11-06 NOTE — PROGRESS NOTES
Name: Manoj Jordan ADMIT: 11/3/2020   : 1955  PCP: Isma Cervantes MD    MRN: [id number] LOS: 2 days   AGE/SEX: 65 y.o. male  ROOM: [facility]     Subjective   Subjective   Nonverbal.  Able to express that he was not in any pain and did not have any shortness of breath.    Review of Systems   Unable to perform ROS: Patient nonverbal      Objective   Objective   Vital Signs  Temp:  [97.2 °F (36.2 °C)-97.6 °F (36.4 °C)] 97.6 °F (36.4 °C)  Heart Rate:  [] 86  Resp:  [19-26] 19  BP: (118-149)/(77-85) 149/85  SpO2:  [89 %-94 %] 94 %  on  Flow (L/min):  [1] 1;   Device (Oxygen Therapy): room air  Body mass index is 18.64 kg/m².    Physical Exam  Vitals signs and nursing note reviewed.   Constitutional:       General: He is not in acute distress.     Appearance: He is cachectic. He is ill-appearing (chronic).   Cardiovascular:      Rate and Rhythm: Normal rate and regular rhythm.   Pulmonary:      Effort: Pulmonary effort is normal. No respiratory distress.      Breath sounds: Decreased breath sounds and rhonchi present.   Abdominal:      General: Bowel sounds are normal.      Palpations: Abdomen is soft.      Tenderness: There is no abdominal tenderness. There is no guarding or rebound.   Musculoskeletal:         General: No swelling.   Skin:     General: Skin is warm and dry.   Neurological:      Mental Status: He is alert.   Psychiatric:         Behavior: Behavior normal.     Results Review  I reviewed the patient's new clinical results.  Results from last 7 days   Lab Units 20  0858 20  0729 20  0740 20  2342   WBC 10*3/mm3 5.76 6.52 4.15 5.05   HEMOGLOBIN g/dL 13.7 12.9* 13.2 13.7   PLATELETS 10*3/mm3 145 139* 118* 119*     Results from last 7 days   Lab Units 20  0858 20  0729 20  1343 20  0740 20  2342   SODIUM mmol/L 146* 150*  --  147* 146*   POTASSIUM mmol/L 4.5 4.7  --  4.8 4.2   CHLORIDE mmol/L 110* 114*  --  110* 107   CO2 mmol/L 27.0  26.6  --  30.1* 31.1*   BUN mg/dL 49* 46*  --  36* 35*   CREATININE mg/dL 1.03 1.02 1.04 0.95 0.98   GLUCOSE mg/dL 191* 142*  --  124* 100*     Estimated Creatinine Clearance: 51.4 mL/min (by C-G formula based on SCr of 1.03 mg/dL).    Results from last 7 days   Lab Units 11/06/20  0858 11/05/20  0729 11/04/20  1343 11/03/20  2342   ALBUMIN g/dL 3.10* 3.20* 3.20* 3.20*   BILIRUBIN mg/dL 0.7 0.6 0.9 1.3*   ALK PHOS U/L 72 76 76 79   AST (SGOT) U/L 43* 50* 47* 53*   ALT (SGPT) U/L 35 35 34 34     Results from last 7 days   Lab Units 11/06/20  0858 11/05/20  0729 11/04/20  1343 11/04/20  0740 11/03/20  2342   CALCIUM mg/dL 9.2 9.2  --  9.3 9.5   ALBUMIN g/dL 3.10* 3.20* 3.20*  --  3.20*     Results from last 7 days   Lab Units 11/04/20  0740 11/03/20  2342   PROCALCITONIN ng/mL 0.18 0.19   LACTATE mmol/L  --  1.3     COVID19   Date Value Ref Range Status   11/04/2020 Detected (C) Not Detected - Ref. Range Final       Scheduled Meds  cholecalciferol, 5,000 Units, Oral, Daily  dexamethasone, 6 mg, Per G Tube, Daily With Breakfast  enoxaparin, 40 mg, Subcutaneous, Q24H  Menthol-Zinc Oxide, , Topical, BID  pantoprazole, 40 mg, Intravenous, Q AM  remdesivir, 100 mg, Intravenous, Q24H  sodium chloride, 10 mL, Intravenous, Q12H    Continuous Infusions  dextrose, 50 mL/hr, Last Rate: 50 mL/hr (11/06/20 1254)  Pharmacy Consult - Remdesivir,     PRN Meds  •  acetaminophen **OR** acetaminophen **OR** acetaminophen  •  bisacodyl  •  bisacodyl  •  calcium carbonate  •  diphenhydrAMINE  •  Glycerin-Hypromellose-  •  loperamide  •  nitroglycerin  •  ondansetron **OR** ondansetron  •  Pharmacy Consult - Remdesivir  •  [COMPLETED] Insert peripheral IV **AND** sodium chloride  •  sodium chloride    dextrose, 50 mL/hr, Last Rate: 50 mL/hr (11/06/20 1254)  Pharmacy Consult - Remdesivir,     Diet  NPO Diet     Assessment/Plan     Active Hospital Problems    Diagnosis  POA   • **Pneumonia due to COVID-19 virus [U07.1, J12.89]  Yes    • Hypernatremia [E87.0]  Unknown   • Thrombocytopenia, unspecified (CMS/[facility]) [D69.6]  Unknown   • Quadriplegia (CMS/[facility]) [G82.50]  Unknown   • H/O cardiac arrest [Z86.74]  Not Applicable   • Brain injury (CMS/[facility]) [S06.9X9A]  Unknown      Resolved Hospital Problems   No resolved problems to display.     65 y.o. 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  Decadron, remdesivir.  Patient is now on room air. No fever.    · Hypernatremia: D5 W another day  · Thrombocytopenia from coronavirus infection improved  · Patient was started on antibiotics for UTI.  However no signs/symptoms to suggest UTI in patient with chronic suprapubic catheter.  Culture growing mixed  · Lovenox 40 mg SC daily for DVT prophylaxis.  · Full code.  · Discussed with nursing staff and care team on multidisciplinary rounds.  · Anticipate discharge to SNU facility tomorrow.    Jaime Hansen MD  [facility]  11/06/20  13:04 EST    I wore protective equipment throughout this patient encounter including a face mask, gloves and protective eyewear.  Hand hygiene was performed before donning protective equipment and after removal when leaving the room.

## 2020-11-06 NOTE — DISCHARGE SUMMARY
[facility]    Date of Discharge:  11/9/2020    PCP: Isma Cervantes MD    Discharge Diagnosis:   Active Hospital Problems    Diagnosis  POA   • **Pneumonia due to COVID-19 virus [U07.1, J12.89]  Yes   • Hypernatremia [E87.0]  Unknown   • Thrombocytopenia, unspecified (CMS/HCC) [D69.6]  Unknown   • Quadriplegia (CMS/HCC) [G82.50]  Unknown   • H/O cardiac arrest [Z86.74]  Not Applicable   • Brain injury (CMS/HCC) [S06.9X9A]  Unknown      Resolved Hospital Problems   No resolved problems to display.      Consults     Date and Time Order Name Status Description    11/4/2020 0109 [facility] (on-call MD unless specified) Details Completed         Hospital Course  Please see history and physical for details. Patient is a 65 y.o. male with a history of brain injury due to cardiac arrest from heroin overdose in 2005 nursing home resident with aphasia recently diagnosed at his facility with Covid.  He had increasing shortness of breath and decline in mental status and hypoxia and was admitted.  He was started on Decadron and remdesivir after discussion with his sister who switched him to full code during this hospitalization.  Mental status is improved and the patient is not having any shortness of breath or discomfort.  He is now on room air.    Can return to facility at discharge    2- COVID RA NH, h/o anoxic brain injury    Condition on Discharge: Improved.     Temp:  [97.2 °F (36.2 °C)-97.6 °F (36.4 °C)] 97.6 °F (36.4 °C)  Heart Rate:  [] 86  Resp:  [19-26] 19  BP: (118-149)/(77-85) 149/85  Body mass index is 18.64 kg/m².    Physical Exam   General chronically ill-appearing, NAD  HEENT NCAT  CV RRR  Lung decreased breath sounds without wheezes  Abdomen ND NT  Extremity contractures present, no edema  Neuro abnormal muscle tone    Disposition: SNF       Discharge Medications      Changes to Medications      Instructions Start Date   oxyCODONE-acetaminophen 5-325 MG  per tablet  Commonly known as: Percocet  What changed:   · how much to take  · how to take this  · when to take this  · reasons to take this  · additional instructions   1 tablet, Oral, Every 6 Hours PRN         Continue These Medications      Instructions Start Date   acetaminophen 500 MG tablet  Commonly known as: TYLENOL   500 mg, Oral, Every 6 Hours PRN      bisacodyl 10 MG suppository  Commonly known as: DULCOLAX   10 mg, Rectal, Daily PRN      diphenhydrAMINE 25 mg capsule  Commonly known as: BENADRYL   25 mg, Per G Tube, Every 8 Hours PRN      Floranex pack oral packet   Oral, 3 Times Daily      guaifenesin 100 MG/5ML liquid  Commonly known as: ROBITUSSIN   100 mg, Per G Tube, 4 Times Daily PRN      ipratropium-albuterol 0.5-2.5 mg/3 ml nebulizer  Commonly known as: DUO-NEB   3 mL, Nebulization, Every 4 Hours PRN      lactobacillus tablet caplet   1 tablet, Per G Tube, 2 Times Daily      levocetirizine 5 MG tablet  Commonly known as: XYZAL   5 mg, Per G Tube, Every Evening      loperamide 2 MG capsule  Commonly known as: IMODIUM   2 mg, Per G Tube, 4 Times Daily PRN      multivitamin with minerals tablet tablet   1 tablet, Per G Tube, Daily      Nystatin powder   Topical, As Needed, Apply to buttocks as needed      omeprazole 20 MG capsule  Commonly known as: priLOSEC   20 mg, Daily      polyethyl glycol-propyl glycol 0.4-0.3 % solution ophthalmic solution  Commonly known as: SYSTANE   Every 1 Hour PRN      Polyethylene Glycol 3350 granules   17 g, Per G Tube, Daily      Risamine 0.44-20.625 % ointment ointment  Generic drug: menthol-zinc oxide   1 application, Topical, 2 times daily, Perirectal area       vitamin C 500 MG tablet  Commonly known as: ASCORBIC ACID   500 mg, Per G Tube, Daily      vitamin D3 125 MCG (5000 UT) capsule capsule   2,000 Units, Per G Tube, Daily      Zinc 100 MG tablet   50 mg, Per G Tube, Daily, For 30 days, started on 10/30/2020               Contact information for follow-up  providers     Isma Cervantes MD .    Specialty: Physical Medicine and Rehabilitation  Contact information:  [address]  [address]  [facility] [address]  [phone]                   Contact information for after-discharge care     Destination     [facility] .    Service: Intermediate Care  Contact information:  [address] [facility] [address] [phone]                            Pending Labs     Order Current Status    Blood Culture - Blood, Arm, Left Preliminary result    Blood Culture - Blood, Arm, Left Preliminary result           Jonas Guillen MD  11/09/20  11:33 EST    Discharge time spent greater than 30 minutes.

## 2020-11-06 NOTE — PLAN OF CARE
Problem: Adult Inpatient Plan of Care  Goal: Plan of Care Review  Outcome: Ongoing, Progressing  Flowsheets  Taken 11/6/2020 0434 by Sophie Ortiz RN    Outcome Summary: Alert, non-verbal. Shakes/nods head to answer questions. Denied pain or discomfort. Remains on room air for duration of shift. Tenacious secretions, patient tolerated suctioning intermittently. Med crushed pre tube. Tube feeds at goal rate of 75 an hour with 45 an hour flush, tube feedings due to stop at 9 am. No falls to report. D5W at 50 mL/hr IV. Patient tolerating care. No new orders at this time, continue to monitor.

## 2020-11-06 NOTE — PROGRESS NOTES
"Adult Nutrition  Assessment/PES    Patient Name:  Manoj Jordan  YOB: 1955  MRN: [id number]  Admit Date:  11/3/2020    Assessment Date:  11/6/2020    Comments:  Nutrition follow up.  TFs of Nutren 1.5 running nocturnally from 8 pm to 9 am daily, tolerating well per RN.  Also tolerating boluses of Nutren 1.5 275 mL at 2 pm daily.    Possible d/c soon.    RD to continue to follow.    Reason for Assessment     Row Name 11/06/20 1442          Reason for Assessment    Reason For Assessment  TF/PN;follow-up protocol         Anthropometrics     Row Name 11/06/20 1442          Anthropometrics    Height  165.1 cm (65\")        Admit Weight    Admit Weight  -- 112# 11/4        Ideal Body Weight (IBW)    Ideal Body Weight (IBW) (kg)  62.51        Body Mass Index (BMI)    BMI Assessment  BMI 18.5-24.9: normal 18.60         Labs/Tests/Procedures/Meds     Row Name 11/06/20 1443          Labs/Procedures/Meds    Lab Results Reviewed  reviewed, pertinent     Lab Results Comments  Gluc, Na, BUN, AST, Alb, CRP        Diagnostic Tests/Procedures    Diagnostic Test/Procedure Reviewed  reviewed, pertinent        Medications    Pertinent Medications Reviewed  reviewed, pertinent     Pertinent Medications Comments  vit D3, dexamethasone, lovenox, protonix, remdesivir, D5W         Physical Findings     Row Name 11/06/20 1444          Physical Findings    Overall Physical Appearance  tetraplegia (quadriplegia)     Gastrointestinal  feeding tube     Tubes  PEG     Skin  other (see comments) B=12, bruised         Estimated/Assessed Needs     Row Name 11/06/20 1442          Calculation Measurements    Height  165.1 cm (65\")         Nutrition Prescription Ordered     Row Name 11/06/20 1444          Nutrition Prescription PO    Current PO Diet  NPO        Nutrition Prescription EN    Enteral Route  PEG     Product  Nutren 1.5 (Osmolite 1.5)     TF Delivery Method  Cyclic;Bolus     TF Bolus Goal Volume (mL)  275 mL     TF " Bolus Frequency  Daily     Cyclic TF Goal Rate (mL/hr)  75 mL/hr     Cyclic TF Cycle Over (hrs)   8 pm to 9 am     Water flush (mL)   45 mL     Water Flush Frequency  Per hour while TFs running; 100 ml before and after bolus         Evaluation of Received Nutrient/Fluid Intake     Row Name 11/06/20 1445          Nutrient/Fluid Evaluation    Additional Documentation  Calories Evaluation (Group);Protein Evaluation (Group);Fluid Intake Evaluation (Group)        Calories Evaluation    Enteral Calories (kcal)  1876     % of Kcal Needs  100        Protein Evaluation    Enteral Protein (gm)  85     % of Protein Needs  100        Intake Assessment    Energy/Calorie Requirement Assessment  meeting needs     Protein Requirement Assessment  meeting needs     Fluid Requirement Assessment  meeting needs     Tolerance  tolerating        Fluid Intake Evaluation    Enteral (Free Water) Fluid (mL)  950     Free Water Flush Fluid (mL)  785     Total Free Water Intake (mL)  1735 mL        EN Evaluation    TF Residual  5-100     HOB  Greater than or equal to 30 degress               Problem/Interventions:          Intervention Goal     Row Name 11/06/20 1447          Intervention Goal    General  Maintain nutrition;Nutrition support treatment;Disease management/therapy     TF/PN  Maintain TF/PN     Weight  Maintain weight         Nutrition Intervention     Row Name 11/06/20 1448          Nutrition Intervention    RD/Tech Action  Follow Tx progress;Care plan reviewd     Recommended/Ordered  EN         Nutrition Prescription     Row Name 11/06/20 1448          Nutrition Prescription EN    Enteral Prescription  Continue same protocol         Education/Evaluation     Row Name 11/06/20 1448          Education    Education  Will Instruct as appropriate        Monitor/Evaluation    Monitor  Per protocol;I&O;Pertinent labs;TF delivery/tolerance;Weight;Symptoms           Electronically signed by:  Nikole Kwon RD  11/06/20 14:49 EST

## 2020-11-06 NOTE — PLAN OF CARE
Problem: Adult Inpatient Plan of Care  Goal: Plan of Care Review  Outcome: Ongoing, Progressing  Flowsheets (Taken 11/6/2020 1720)  Progress: no change  Plan of Care Reviewed With:   patient   sibling  Outcome Summary: Pt is non-verbal, able to follow simple commands, shakes/nods head appropriately to answer questions. Denies pain, discomfort. On RA. Coughing independently, nonproductive. Lungs sound rhonchi, diminished. Turn Q2H. Tube feeds administered as ordered. Skin care provided. Leaking suprapubic cath. Cont. remdesivir and decadron. Monitor labs. Plans to return to facility soon.  Goal: Patient-Specific Goal (Individualized)  Outcome: Ongoing, Progressing  Goal: Absence of Hospital-Acquired Illness or Injury  Outcome: Ongoing, Progressing  Intervention: Identify and Manage Fall Risk  Recent Flowsheet Documentation  Taken 11/6/2020 1644 by Ivana King, RN  Safety Promotion/Fall Prevention:   safety round/check completed   room organization consistent   nonskid shoes/slippers when out of bed   fall prevention program maintained   assistive device/personal items within reach   clutter free environment maintained   activity supervised  Taken 11/6/2020 1429 by Ivana King, RN  Safety Promotion/Fall Prevention:   safety round/check completed   room organization consistent   nonskid shoes/slippers when out of bed   fall prevention program maintained   clutter free environment maintained   assistive device/personal items within reach   activity supervised  Taken 11/6/2020 1254 by Ivana King, RN  Safety Promotion/Fall Prevention:   safety round/check completed   room organization consistent   nonskid shoes/slippers when out of bed   fall prevention program maintained   clutter free environment maintained   assistive device/personal items within reach   activity supervised  Taken 11/6/2020 0914 by Ivana King, RN  Safety Promotion/Fall Prevention:   safety round/check completed   room  organization consistent   nonskid shoes/slippers when out of bed   fall prevention program maintained   clutter free environment maintained   assistive device/personal items within reach   activity supervised   lighting adjusted  Taken 11/6/2020 0725 by Ivana King RN  Safety Promotion/Fall Prevention:   safety round/check completed   nonskid shoes/slippers when out of bed   room organization consistent   fall prevention program maintained   clutter free environment maintained   assistive device/personal items within reach   activity supervised  Intervention: Prevent Skin Injury  Recent Flowsheet Documentation  Taken 11/6/2020 1644 by Ivana King RN  Body Position: tilted, right  Taken 11/6/2020 1429 by Ivana King RN  Body Position:   supine, legs elevated   turned  Taken 11/6/2020 1254 by Ivana King RN  Body Position:   turned   tilted, right  Taken 11/6/2020 0914 by Ivana King RN  Body Position:   turned   tilted, left  Intervention: Prevent and Manage VTE (venous thromboembolism) Risk  Recent Flowsheet Documentation  Taken 11/6/2020 1429 by Ivana King RN  VTE Prevention/Management: (lovenox inj.) other (see comments)  Taken 11/6/2020 0914 by Ivana King RN  VTE Prevention/Management: (lovenox inj.) other (see comments)  Intervention: Prevent Infection  Recent Flowsheet Documentation  Taken 11/6/2020 0914 by Ivana King RN  Infection Prevention:   visitors restricted/screened   single patient room provided   rest/sleep promoted   personal protective equipment utilized   hand hygiene promoted   equipment surfaces disinfected  Goal: Optimal Comfort and Wellbeing  Outcome: Ongoing, Progressing  Intervention: Provide Person-Centered Care  Recent Flowsheet Documentation  Taken 11/6/2020 0914 by Ivana King RN  Trust Relationship/Rapport:   care explained   reassurance provided   empathic listening provided   thoughts/feelings acknowledged  Goal: Readiness for  Transition of Care  Outcome: Ongoing, Progressing     Problem: Skin Injury Risk Increased  Goal: Skin Health and Integrity  Outcome: Ongoing, Progressing  Intervention: Optimize Skin Protection  Recent Flowsheet Documentation  Taken 11/6/2020 1644 by Ivana King RN  Head of Bed (HOB): HOB at 30 degrees  Taken 11/6/2020 1429 by Ivana King RN  Head of Bed (HOB): HOB at 30 degrees  Taken 11/6/2020 1254 by Ivana King RN  Head of Bed (HOB): HOB at 30 degrees  Taken 11/6/2020 0914 by Ivana King RN  Pressure Reduction Techniques:   frequent weight shift encouraged   heels elevated off bed   weight shift assistance provided  Head of Bed (HOB): HOB at 30 degrees  Pressure Reduction Devices:   pressure-redistributing mattress utilized   heel offloading device utilized   alternating pressure pump (ADD)  Skin Protection:   adhesive use limited   incontinence pads utilized   skin sealant/moisture barrier applied   pulse oximeter probe site changed     Problem: Fall Injury Risk  Goal: Absence of Fall and Fall-Related Injury  Outcome: Ongoing, Progressing  Intervention: Identify and Manage Contributors to Fall Injury Risk  Recent Flowsheet Documentation  Taken 11/6/2020 0914 by Ivana King RN  Medication Review/Management: medications reviewed  Intervention: Promote Injury-Free Environment  Recent Flowsheet Documentation  Taken 11/6/2020 1644 by Ivana King RN  Safety Promotion/Fall Prevention:   safety round/check completed   room organization consistent   nonskid shoes/slippers when out of bed   fall prevention program maintained   assistive device/personal items within reach   clutter free environment maintained   activity supervised  Taken 11/6/2020 1429 by Ivana King RN  Safety Promotion/Fall Prevention:   safety round/check completed   room organization consistent   nonskid shoes/slippers when out of bed   fall prevention program maintained   clutter free environment maintained    assistive device/personal items within reach   activity supervised  Taken 11/6/2020 1254 by Ivana King RN  Safety Promotion/Fall Prevention:   safety round/check completed   room organization consistent   nonskid shoes/slippers when out of bed   fall prevention program maintained   clutter free environment maintained   assistive device/personal items within reach   activity supervised  Taken 11/6/2020 0914 by Ivana King RN  Safety Promotion/Fall Prevention:   safety round/check completed   room organization consistent   nonskid shoes/slippers when out of bed   fall prevention program maintained   clutter free environment maintained   assistive device/personal items within reach   activity supervised   lighting adjusted  Taken 11/6/2020 0725 by Ivana King RN  Safety Promotion/Fall Prevention:   safety round/check completed   nonskid shoes/slippers when out of bed   room organization consistent   fall prevention program maintained   clutter free environment maintained   assistive device/personal items within reach   activity supervised     Problem: Infection  Goal: Infection Symptom Resolution  Outcome: Ongoing, Progressing  Intervention: Prevent or Manage Infection  Recent Flowsheet Documentation  Taken 11/6/2020 1644 by Ivana King RN  Isolation Precautions:   contact precautions maintained   droplet precautions maintained  Taken 11/6/2020 1429 by Ivana King RN  Isolation Precautions:   contact precautions maintained   droplet precautions maintained  Taken 11/6/2020 1254 by Ivana King RN  Isolation Precautions:   contact precautions maintained   droplet precautions maintained  Taken 11/6/2020 0914 by Ivana King RN  Isolation Precautions:   contact precautions maintained   droplet precautions maintained  Taken 11/6/2020 0725 by Ivana King RN  Isolation Precautions:   contact precautions maintained   droplet precautions maintained     Problem: UTI (Urinary Tract  Infection)  Goal: Improved Infection Symptoms  Outcome: Ongoing, Progressing  Intervention: Facilitate Optimal Urinary Elimination  Recent Flowsheet Documentation  Taken 11/6/2020 0914 by Ivana King RN  Urinary Elimination Promotion: absorbent pad/diaper use encouraged     Problem: Aspiration (Enteral Nutrition)  Goal: Absence of Aspiration Signs and Symptoms  Outcome: Ongoing, Progressing  Intervention: Minimize Aspiration Risk  Recent Flowsheet Documentation  Taken 11/6/2020 1644 by Ivana King RN  Head of Bed (HOB): HOB at 30 degrees  Taken 11/6/2020 1429 by Ivana King RN  Head of Bed (HOB): HOB at 30 degrees  Taken 11/6/2020 1254 by Ivana King RN  Head of Bed (HOB): HOB at 30 degrees  Oral Care:   oral rinse provided   swabbed with antiseptic solution  Taken 11/6/2020 0914 by Ivana King RN  Head of Bed ([facility]): HOB at 30 degrees     Problem: Device-Related Complication Risk (Enteral Nutrition)  Goal: Safe, Effective Therapy Delivery  Outcome: Ongoing, Progressing  Intervention: Prevent Feeding-Related Adverse Events  Recent Flowsheet Documentation  Taken 11/6/2020 1429 by Ivana King RN  Enteral Feeding Safety: placement checked  Taken 11/6/2020 1254 by Ivana King RN  Enteral Feeding Safety: placement checked  Taken 11/6/2020 0914 by Ivana King RN  Enteral Feeding Safety: placement checked     Problem: Feeding Intolerance (Enteral Nutrition)  Goal: Feeding Tolerance  Outcome: Ongoing, Progressing  Intervention: Prevent and Manage Feeding Intolerance  Recent Flowsheet Documentation  Taken 11/6/2020 1429 by Ivana King RN  Nutrition Support Management: (TF Bolus administered per order) other (see comments)  Taken 11/6/2020 0914 by Ivana King RN  Nutrition Support Management: (per order) tube feeding held   Goal Outcome Evaluation:  Plan of Care Reviewed With: patient, sibling  Progress: no change  Outcome Summary: Pt is non-verbal, able to follow  simple commands, shakes/nods head appropriately to answer questions. Denies pain, discomfort. On RA. Coughing independently, nonproductive. Lungs sound rhonchi, diminished. Turn Q2H. Tube feeds administered as ordered. Skin care provided. Leaking suprapubic cath. Cont. remdesivir and decadron. Monitor labs. Plans to return to facility soon.

## 2020-11-06 NOTE — PROGRESS NOTES
Continued Stay Note  [facility]     Patient Name: Manoj Jordan  MRN: [id number]  Today's Date: 11/6/2020    Admit Date: 11/3/2020    Discharge Plan     Row Name 11/06/20 1626       Plan    Plan  Plans are to dc to IC level of care @ [facility].  Pt will need ambulance transport.  Updated sister Reina by phone regarding weekend dc.  CCP will follow. Kathy Marti RN/CCP        Discharge Codes    No documentation.       Expected Discharge Date and Time     Expected Discharge Date Expected Discharge Time    Nov 7, 2020             Kathy Marti RN

## 2020-11-07 LAB
ALBUMIN SERPL-MCNC: 3 G/DL (ref 3.5–5.2)
ALBUMIN/GLOB SERPL: 0.9 G/DL
ALP SERPL-CCNC: 70 U/L (ref 39–117)
ALT SERPL W P-5'-P-CCNC: 32 U/L (ref 1–41)
ANION GAP SERPL CALCULATED.3IONS-SCNC: 5.6 MMOL/L (ref 5–15)
AST SERPL-CCNC: 31 U/L (ref 1–40)
BASOPHILS # BLD AUTO: 0.01 10*3/MM3 (ref 0–0.2)
BASOPHILS NFR BLD AUTO: 0.2 % (ref 0–1.5)
BILIRUB CONJ SERPL-MCNC: 0.3 MG/DL (ref 0–0.3)
BILIRUB SERPL-MCNC: 0.8 MG/DL (ref 0–1.2)
BUN SERPL-MCNC: 41 MG/DL (ref 8–23)
BUN/CREAT SERPL: 47.1 (ref 7–25)
CALCIUM SPEC-SCNC: 9 MG/DL (ref 8.6–10.5)
CHLORIDE SERPL-SCNC: 108 MMOL/L (ref 98–107)
CK SERPL-CCNC: 29 U/L (ref 20–200)
CO2 SERPL-SCNC: 30.4 MMOL/L (ref 22–29)
CREAT SERPL-MCNC: 0.87 MG/DL (ref 0.76–1.27)
CRP SERPL-MCNC: 1.58 MG/DL (ref 0–0.5)
D DIMER PPP FEU-MCNC: 0.37 MCGFEU/ML (ref 0–0.49)
DEPRECATED RDW RBC AUTO: 46.4 FL (ref 37–54)
EOSINOPHIL # BLD AUTO: 0 10*3/MM3 (ref 0–0.4)
EOSINOPHIL NFR BLD AUTO: 0 % (ref 0.3–6.2)
ERYTHROCYTE [DISTWIDTH] IN BLOOD BY AUTOMATED COUNT: 14.8 % (ref 12.3–15.4)
FERRITIN SERPL-MCNC: 299 NG/ML (ref 30–400)
FIBRINOGEN PPP-MCNC: 548 MG/DL (ref 219–464)
GFR SERPL CREATININE-BSD FRML MDRD: 88 ML/MIN/1.73
GLOBULIN UR ELPH-MCNC: 3.4 GM/DL
GLUCOSE SERPL-MCNC: 119 MG/DL (ref 65–99)
HCT VFR BLD AUTO: 38.5 % (ref 37.5–51)
HGB BLD-MCNC: 12.6 G/DL (ref 13–17.7)
IMM GRANULOCYTES # BLD AUTO: 0.05 10*3/MM3 (ref 0–0.05)
IMM GRANULOCYTES NFR BLD AUTO: 0.8 % (ref 0–0.5)
LDH SERPL-CCNC: 203 U/L (ref 135–225)
LYMPHOCYTES # BLD AUTO: 0.91 10*3/MM3 (ref 0.7–3.1)
LYMPHOCYTES NFR BLD AUTO: 14.2 % (ref 19.6–45.3)
MCH RBC QN AUTO: 29 PG (ref 26.6–33)
MCHC RBC AUTO-ENTMCNC: 32.7 G/DL (ref 31.5–35.7)
MCV RBC AUTO: 88.7 FL (ref 79–97)
MONOCYTES # BLD AUTO: 0.54 10*3/MM3 (ref 0.1–0.9)
MONOCYTES NFR BLD AUTO: 8.5 % (ref 5–12)
NEUTROPHILS NFR BLD AUTO: 4.88 10*3/MM3 (ref 1.7–7)
NEUTROPHILS NFR BLD AUTO: 76.3 % (ref 42.7–76)
NRBC BLD AUTO-RTO: 0 /100 WBC (ref 0–0.2)
PLATELET # BLD AUTO: 151 10*3/MM3 (ref 140–450)
PMV BLD AUTO: 12.1 FL (ref 6–12)
POTASSIUM SERPL-SCNC: 4.4 MMOL/L (ref 3.5–5.2)
PROT SERPL-MCNC: 6.4 G/DL (ref 6–8.5)
RBC # BLD AUTO: 4.34 10*6/MM3 (ref 4.14–5.8)
SODIUM SERPL-SCNC: 144 MMOL/L (ref 136–145)
WBC # BLD AUTO: 6.39 10*3/MM3 (ref 3.4–10.8)

## 2020-11-07 PROCEDURE — 25010000002 ENOXAPARIN PER 10 MG: Performed by: NURSE PRACTITIONER

## 2020-11-07 PROCEDURE — 80053 COMPREHEN METABOLIC PANEL: CPT | Performed by: HOSPITALIST

## 2020-11-07 PROCEDURE — 83615 LACTATE (LD) (LDH) ENZYME: CPT | Performed by: HOSPITALIST

## 2020-11-07 PROCEDURE — 85025 COMPLETE CBC W/AUTO DIFF WBC: CPT | Performed by: HOSPITALIST

## 2020-11-07 PROCEDURE — 82248 BILIRUBIN DIRECT: CPT | Performed by: HOSPITALIST

## 2020-11-07 PROCEDURE — 82728 ASSAY OF FERRITIN: CPT | Performed by: HOSPITALIST

## 2020-11-07 PROCEDURE — 82550 ASSAY OF CK (CPK): CPT | Performed by: HOSPITALIST

## 2020-11-07 PROCEDURE — 63710000001 DEXAMETHASONE PER 0.25 MG: Performed by: HOSPITALIST

## 2020-11-07 PROCEDURE — 85379 FIBRIN DEGRADATION QUANT: CPT | Performed by: HOSPITALIST

## 2020-11-07 PROCEDURE — 86140 C-REACTIVE PROTEIN: CPT | Performed by: HOSPITALIST

## 2020-11-07 PROCEDURE — 85384 FIBRINOGEN ACTIVITY: CPT | Performed by: HOSPITALIST

## 2020-11-07 RX ADMIN — DEXTROSE MONOHYDRATE 50 ML/HR: 50 INJECTION, SOLUTION INTRAVENOUS at 12:59

## 2020-11-07 RX ADMIN — PANTOPRAZOLE SODIUM 40 MG: 40 INJECTION, POWDER, FOR SOLUTION INTRAVENOUS at 05:56

## 2020-11-07 RX ADMIN — DEXAMETHASONE 6 MG: 4 TABLET ORAL at 08:29

## 2020-11-07 RX ADMIN — Medication: at 20:40

## 2020-11-07 RX ADMIN — SODIUM CHLORIDE, PRESERVATIVE FREE 10 ML: 5 INJECTION INTRAVENOUS at 08:29

## 2020-11-07 RX ADMIN — ENOXAPARIN SODIUM 40 MG: 40 INJECTION SUBCUTANEOUS at 08:30

## 2020-11-07 RX ADMIN — REMDESIVIR 100 MG: 100 INJECTION, POWDER, LYOPHILIZED, FOR SOLUTION INTRAVENOUS at 12:59

## 2020-11-07 RX ADMIN — Medication: at 08:31

## 2020-11-07 RX ADMIN — Medication 5000 UNITS: at 08:30

## 2020-11-07 NOTE — PLAN OF CARE
Goal Outcome Evaluation:  Plan of Care Reviewed With: patient, sibling  Progress: no change   : Pt is non-verbal, able to follow simple commands, shakes/nods head appropriately to answer questions. Denies pain, discomfort. On RA. Coughing independently, nonproductive. Lungs sound rhonchi, diminished. Turn Q2H. Tube feeds administered as ordered. Skin care provided. Leaking suprapubic cath. Cont. remdesivir and decadron. Monitor labs. Plans to return to facility soon.

## 2020-11-07 NOTE — PLAN OF CARE
Problem: Adult Inpatient Plan of Care  Goal: Plan of Care Review  Outcome: Ongoing, Progressing  Outcome Summary: Pt is non-verbal, able to follow simple commands, shakes/nods head appropriately to answer questions. Denies pain, discomfort. On RA. Coughing independently, nonproductive. Lungs sound rhonchi, diminished. Turn Q2H. Tube feeds administered as ordered. Skin care provided. Leaking suprapubic cath. Cont. remdesivir and decadron. Monitor labs. Plans to return to facility soon.

## 2020-11-07 NOTE — PROGRESS NOTES
Name: Manoj Jordan ADMIT: 11/3/2020   : 1955  PCP: Isma Cervantes MD    MRN: [id number] LOS: 3 days   AGE/SEX: 65 y.o. male  ROOM: [facility]     Subjective   Subjective   No new problems noted    Review of Systems   Unable to perform ROS: Patient nonverbal        Objective   Objective   Vital Signs  Temp:  [96.8 °F (36 °C)-97.8 °F (36.6 °C)] 97.8 °F (36.6 °C)  Heart Rate:  [74-80] 77  Resp:  [20] 20  BP: (117-132)/(79-86) 117/79  SpO2:  [91 %-96 %] 91 %  on   ;   Device (Oxygen Therapy): room air  Body mass index is 18.64 kg/m².  Physical Exam  Vitals signs and nursing note reviewed.   Constitutional:       General: He is not in acute distress.  HENT:      Head: Normocephalic and atraumatic.      Mouth/Throat:      Pharynx: Oropharynx is clear.   Eyes:      General: No scleral icterus.  Neck:      Musculoskeletal: Neck supple.   Cardiovascular:      Rate and Rhythm: Normal rate and regular rhythm.      Pulses: Normal pulses.      Heart sounds: Normal heart sounds.   Pulmonary:      Effort: Pulmonary effort is normal.      Breath sounds: Normal breath sounds.   Abdominal:      General: Abdomen is flat. Bowel sounds are normal.      Palpations: Abdomen is soft.   Neurological:      General: No focal deficit present.      Mental Status: He is alert. Mental status is at baseline.         Results Review     I reviewed the patient's new clinical results.  Results from last 7 days   Lab Units 20  0641 20  0858 20  0729 20  0740   WBC 10*3/mm3 6.39 5.76 6.52 4.15   HEMOGLOBIN g/dL 12.6* 13.7 12.9* 13.2   PLATELETS 10*3/mm3 151 145 139* 118*     Results from last 7 days   Lab Units 20  0641 20  0858 20  0729 20  1343 20  0740   SODIUM mmol/L 144 146* 150*  --  147*   POTASSIUM mmol/L 4.4 4.5 4.7  --  4.8   CHLORIDE mmol/L 108* 110* 114*  --  110*   CO2 mmol/L 30.4* 27.0 26.6  --  30.1*   BUN mg/dL 41* 49* 46*  --  36*   CREATININE mg/dL 0.87 1.03 1.02  1.04 0.95   GLUCOSE mg/dL 119* 191* 142*  --  124*   Estimated Creatinine Clearance: 60.8 mL/min (by C-G formula based on SCr of 0.87 mg/dL).  Results from last 7 days   Lab Units 11/07/20  0641 11/06/20  0858 11/05/20  0729 11/04/20  1343   ALBUMIN g/dL 3.00* 3.10* 3.20* 3.20*   BILIRUBIN mg/dL 0.8 0.7 0.6 0.9   ALK PHOS U/L 70 72 76 76   AST (SGOT) U/L 31 43* 50* 47*   ALT (SGPT) U/L 32 35 35 34     Results from last 7 days   Lab Units 11/07/20  0641 11/06/20  0858 11/05/20  0729 11/04/20  1343 11/04/20  0740   CALCIUM mg/dL 9.0 9.2 9.2  --  9.3   ALBUMIN g/dL 3.00* 3.10* 3.20* 3.20*  --      Results from last 7 days   Lab Units 11/04/20  0740 11/03/20  2342   PROCALCITONIN ng/mL 0.18 0.19   LACTATE mmol/L  --  1.3     COVID19   Date Value Ref Range Status   11/04/2020 Detected (C) Not Detected - Ref. Range Final     No results found for: HGBA1C, POCGLU    XR Chest 1 View  Narrative: SINGLE VIEW CHEST     HISTORY: Shortness of air. Rule out COVID 19.     COMPARISON: None available.     FINDINGS:  Heart size is within normal limits. Bilateral infiltrates are seen.  These are in an upper lobe predominant distribution. No pneumothorax or  pleural effusion is seen     Impression: Bilateral pulmonary infiltrates.     This report was finalized on 11/4/2020 12:09 AM by Dr. Mariely Peña M.D.       Scheduled Medications  cholecalciferol, 5,000 Units, Oral, Daily  dexamethasone, 6 mg, Per G Tube, Daily With Breakfast  enoxaparin, 40 mg, Subcutaneous, Q24H  Menthol-Zinc Oxide, , Topical, BID  pantoprazole, 40 mg, Intravenous, Q AM  remdesivir, 100 mg, Intravenous, Q24H  sodium chloride, 10 mL, Intravenous, Q12H    Infusions  dextrose, 50 mL/hr, Last Rate: 50 mL/hr (11/07/20 1259)  Pharmacy Consult - Remdesivir,     Diet  NPO Diet       Assessment/Plan     Active Hospital Problems    Diagnosis  POA   • **Pneumonia due to COVID-19 virus [U07.1, J12.89]  Yes   • Hypernatremia [E87.0]  Unknown   • Thrombocytopenia,  unspecified (CMS/[facility]) [D69.6]  Unknown   • Quadriplegia (CMS/[facility]) [G82.50]  Unknown   • H/O cardiac arrest [Z86.74]  Not Applicable   • Brain injury (CMS/[facility]) [S06.9X9A]  Unknown      Resolved Hospital Problems   No resolved problems to display.       65 y.o. male admitted with Pneumonia due to COVID-19 virus.    65 y.o. 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  Decadron, remdesivir.  Patient is now on room air. No fever.    · Hypernatremia: D5 W another day  · Thrombocytopenia from coronavirus infection improved  · Patient was started on antibiotics for UTI.  However no signs/symptoms to suggest UTI in patient with chronic suprapubic catheter.  Culture growing mixed  · Lovenox 40 mg SC daily for DVT prophylaxis.  · Full code.  · Discussed with nursing staff and care team on multidisciplinary rounds.  · Anticipate discharge to SNU facility  Monday after completing course of remdesivir         Deepak Fernandez MD  [facility]  11/07/20  14:01 EST      I wore protective equipment throughout this patient encounter including a face mask, gloves and protective eyewear.  Hand hygiene was performed before donning protective equipment and after removal when leaving the room.

## 2020-11-08 LAB
ALBUMIN SERPL-MCNC: 3 G/DL (ref 3.5–5.2)
ALBUMIN/GLOB SERPL: 1 G/DL
ALP SERPL-CCNC: 69 U/L (ref 39–117)
ALT SERPL W P-5'-P-CCNC: 36 U/L (ref 1–41)
ANION GAP SERPL CALCULATED.3IONS-SCNC: 11.4 MMOL/L (ref 5–15)
AST SERPL-CCNC: 36 U/L (ref 1–40)
BASOPHILS # BLD AUTO: 0.02 10*3/MM3 (ref 0–0.2)
BASOPHILS NFR BLD AUTO: 0.3 % (ref 0–1.5)
BILIRUB CONJ SERPL-MCNC: 0.3 MG/DL (ref 0–0.3)
BILIRUB SERPL-MCNC: 0.7 MG/DL (ref 0–1.2)
BUN SERPL-MCNC: 42 MG/DL (ref 8–23)
BUN/CREAT SERPL: 44.2 (ref 7–25)
CALCIUM SPEC-SCNC: 8.5 MG/DL (ref 8.6–10.5)
CHLORIDE SERPL-SCNC: 106 MMOL/L (ref 98–107)
CK SERPL-CCNC: 29 U/L (ref 20–200)
CO2 SERPL-SCNC: 22.6 MMOL/L (ref 22–29)
CREAT SERPL-MCNC: 0.95 MG/DL (ref 0.76–1.27)
CRP SERPL-MCNC: 0.9 MG/DL (ref 0–0.5)
DEPRECATED RDW RBC AUTO: 48.3 FL (ref 37–54)
EOSINOPHIL # BLD AUTO: 0 10*3/MM3 (ref 0–0.4)
EOSINOPHIL NFR BLD AUTO: 0 % (ref 0.3–6.2)
ERYTHROCYTE [DISTWIDTH] IN BLOOD BY AUTOMATED COUNT: 14.8 % (ref 12.3–15.4)
FERRITIN SERPL-MCNC: 222 NG/ML (ref 30–400)
GFR SERPL CREATININE-BSD FRML MDRD: 80 ML/MIN/1.73
GLOBULIN UR ELPH-MCNC: 2.9 GM/DL
GLUCOSE SERPL-MCNC: 93 MG/DL (ref 65–99)
HCT VFR BLD AUTO: 39.6 % (ref 37.5–51)
HGB BLD-MCNC: 13.1 G/DL (ref 13–17.7)
IMM GRANULOCYTES # BLD AUTO: 0.15 10*3/MM3 (ref 0–0.05)
IMM GRANULOCYTES NFR BLD AUTO: 2.1 % (ref 0–0.5)
LYMPHOCYTES # BLD AUTO: 1.24 10*3/MM3 (ref 0.7–3.1)
LYMPHOCYTES NFR BLD AUTO: 17.2 % (ref 19.6–45.3)
MCH RBC QN AUTO: 29.4 PG (ref 26.6–33)
MCHC RBC AUTO-ENTMCNC: 33.1 G/DL (ref 31.5–35.7)
MCV RBC AUTO: 88.8 FL (ref 79–97)
MONOCYTES # BLD AUTO: 0.88 10*3/MM3 (ref 0.1–0.9)
MONOCYTES NFR BLD AUTO: 12.2 % (ref 5–12)
NEUTROPHILS NFR BLD AUTO: 4.93 10*3/MM3 (ref 1.7–7)
NEUTROPHILS NFR BLD AUTO: 68.2 % (ref 42.7–76)
NRBC BLD AUTO-RTO: 0 /100 WBC (ref 0–0.2)
PLATELET # BLD AUTO: 176 10*3/MM3 (ref 140–450)
PMV BLD AUTO: 12 FL (ref 6–12)
POTASSIUM SERPL-SCNC: 4.8 MMOL/L (ref 3.5–5.2)
PROT SERPL-MCNC: 5.9 G/DL (ref 6–8.5)
RBC # BLD AUTO: 4.46 10*6/MM3 (ref 4.14–5.8)
SODIUM SERPL-SCNC: 140 MMOL/L (ref 136–145)
WBC # BLD AUTO: 7.22 10*3/MM3 (ref 3.4–10.8)

## 2020-11-08 PROCEDURE — 82728 ASSAY OF FERRITIN: CPT | Performed by: HOSPITALIST

## 2020-11-08 PROCEDURE — 63710000001 DEXAMETHASONE PER 0.25 MG: Performed by: HOSPITALIST

## 2020-11-08 PROCEDURE — 82248 BILIRUBIN DIRECT: CPT | Performed by: HOSPITALIST

## 2020-11-08 PROCEDURE — 85025 COMPLETE CBC W/AUTO DIFF WBC: CPT | Performed by: HOSPITALIST

## 2020-11-08 PROCEDURE — 86140 C-REACTIVE PROTEIN: CPT | Performed by: HOSPITALIST

## 2020-11-08 PROCEDURE — 25010000002 ENOXAPARIN PER 10 MG: Performed by: NURSE PRACTITIONER

## 2020-11-08 PROCEDURE — 80053 COMPREHEN METABOLIC PANEL: CPT | Performed by: HOSPITALIST

## 2020-11-08 PROCEDURE — 82550 ASSAY OF CK (CPK): CPT | Performed by: HOSPITALIST

## 2020-11-08 RX ORDER — LANSOPRAZOLE
30 KIT DAILY
Status: DISCONTINUED | OUTPATIENT
Start: 2020-11-09 | End: 2020-11-09 | Stop reason: HOSPADM

## 2020-11-08 RX ADMIN — SODIUM CHLORIDE, PRESERVATIVE FREE 10 ML: 5 INJECTION INTRAVENOUS at 09:48

## 2020-11-08 RX ADMIN — DEXAMETHASONE 6 MG: 4 TABLET ORAL at 09:46

## 2020-11-08 RX ADMIN — REMDESIVIR 100 MG: 100 INJECTION, POWDER, LYOPHILIZED, FOR SOLUTION INTRAVENOUS at 11:40

## 2020-11-08 RX ADMIN — Medication: at 09:48

## 2020-11-08 RX ADMIN — Medication 5000 UNITS: at 09:46

## 2020-11-08 RX ADMIN — Medication: at 20:23

## 2020-11-08 RX ADMIN — ENOXAPARIN SODIUM 40 MG: 40 INJECTION SUBCUTANEOUS at 09:46

## 2020-11-08 RX ADMIN — PANTOPRAZOLE SODIUM 40 MG: 40 INJECTION, POWDER, FOR SOLUTION INTRAVENOUS at 06:41

## 2020-11-08 NOTE — PLAN OF CARE
Goal Outcome Evaluation:  Plan of Care Reviewed With: patient, sibling  Progress: no change   Pt is non-verbal, able to follow simple commands, shakes/nods head appropriately to answer questions. Denies pain, discomfort. On RA. Coughing independently, nonproductive. Lungs sound rhonchi, diminished, will remain on remdesivir till Monday than possible D/C. Turn Q2H. Tube feeds administered as ordered. Skin care provided. Leaking suprapubic cath, wrapped with incont wrap.Plans to return to faciliy tomorrow pending facility approval.

## 2020-11-08 NOTE — PLAN OF CARE
Problem: Adult Inpatient Plan of Care  Goal: Plan of Care Review  Outcome: Ongoing, Progressing    Outcome Summary: Pt is non-verbal, able to follow simple commands, shakes/nods head appropriately to answer questions. Denies pain, discomfort. On RA. Coughing independently, nonproductive. Lungs sound rhonchi, diminished, will remain on remdesivir till Monday than possible D/C. Turn Q2H. Tube feeds administered as ordered. Skin care provided. Leaking suprapubic cath, wrapped with incont wrap. Monitor labs. Plans to return to facility soon.

## 2020-11-09 VITALS
TEMPERATURE: 98 F | HEIGHT: 65 IN | WEIGHT: 112 LBS | DIASTOLIC BLOOD PRESSURE: 53 MMHG | BODY MASS INDEX: 18.66 KG/M2 | RESPIRATION RATE: 16 BRPM | SYSTOLIC BLOOD PRESSURE: 97 MMHG | OXYGEN SATURATION: 91 % | HEART RATE: 93 BPM

## 2020-11-09 LAB
ALBUMIN SERPL-MCNC: 2.9 G/DL (ref 3.5–5.2)
ALBUMIN/GLOB SERPL: 1 G/DL
ALP SERPL-CCNC: 77 U/L (ref 39–117)
ALT SERPL W P-5'-P-CCNC: 33 U/L (ref 1–41)
ANION GAP SERPL CALCULATED.3IONS-SCNC: 8.9 MMOL/L (ref 5–15)
AST SERPL-CCNC: 30 U/L (ref 1–40)
BACTERIA SPEC AEROBE CULT: NORMAL
BACTERIA SPEC AEROBE CULT: NORMAL
BASOPHILS # BLD AUTO: 0.06 10*3/MM3 (ref 0–0.2)
BASOPHILS NFR BLD AUTO: 0.8 % (ref 0–1.5)
BILIRUB CONJ SERPL-MCNC: 0.3 MG/DL (ref 0–0.3)
BILIRUB SERPL-MCNC: 0.9 MG/DL (ref 0–1.2)
BUN SERPL-MCNC: 36 MG/DL (ref 8–23)
BUN/CREAT SERPL: 38.7 (ref 7–25)
CALCIUM SPEC-SCNC: 8.7 MG/DL (ref 8.6–10.5)
CHLORIDE SERPL-SCNC: 102 MMOL/L (ref 98–107)
CK SERPL-CCNC: 24 U/L (ref 20–200)
CO2 SERPL-SCNC: 28.1 MMOL/L (ref 22–29)
CREAT SERPL-MCNC: 0.93 MG/DL (ref 0.76–1.27)
CRP SERPL-MCNC: 1.26 MG/DL (ref 0–0.5)
D DIMER PPP FEU-MCNC: 0.43 MCGFEU/ML (ref 0–0.49)
DEPRECATED RDW RBC AUTO: 50.1 FL (ref 37–54)
EOSINOPHIL # BLD AUTO: 0.06 10*3/MM3 (ref 0–0.4)
EOSINOPHIL NFR BLD AUTO: 0.8 % (ref 0.3–6.2)
ERYTHROCYTE [DISTWIDTH] IN BLOOD BY AUTOMATED COUNT: 15 % (ref 12.3–15.4)
FERRITIN SERPL-MCNC: 192 NG/ML (ref 30–400)
FIBRINOGEN PPP-MCNC: 474 MG/DL (ref 219–464)
GFR SERPL CREATININE-BSD FRML MDRD: 82 ML/MIN/1.73
GLOBULIN UR ELPH-MCNC: 3 GM/DL
GLUCOSE SERPL-MCNC: 155 MG/DL (ref 65–99)
HCT VFR BLD AUTO: 44.3 % (ref 37.5–51)
HGB BLD-MCNC: 14.1 G/DL (ref 13–17.7)
IMM GRANULOCYTES # BLD AUTO: 0.29 10*3/MM3 (ref 0–0.05)
IMM GRANULOCYTES NFR BLD AUTO: 3.9 % (ref 0–0.5)
LDH SERPL-CCNC: 188 U/L (ref 135–225)
LYMPHOCYTES # BLD AUTO: 1.63 10*3/MM3 (ref 0.7–3.1)
LYMPHOCYTES NFR BLD AUTO: 22.1 % (ref 19.6–45.3)
MCH RBC QN AUTO: 28.8 PG (ref 26.6–33)
MCHC RBC AUTO-ENTMCNC: 31.8 G/DL (ref 31.5–35.7)
MCV RBC AUTO: 90.6 FL (ref 79–97)
MONOCYTES # BLD AUTO: 0.86 10*3/MM3 (ref 0.1–0.9)
MONOCYTES NFR BLD AUTO: 11.7 % (ref 5–12)
NEUTROPHILS NFR BLD AUTO: 4.47 10*3/MM3 (ref 1.7–7)
NEUTROPHILS NFR BLD AUTO: 60.7 % (ref 42.7–76)
NRBC BLD AUTO-RTO: 0.1 /100 WBC (ref 0–0.2)
PLATELET # BLD AUTO: 255 10*3/MM3 (ref 140–450)
PMV BLD AUTO: 12 FL (ref 6–12)
POTASSIUM SERPL-SCNC: 3.9 MMOL/L (ref 3.5–5.2)
PROT SERPL-MCNC: 5.9 G/DL (ref 6–8.5)
RBC # BLD AUTO: 4.89 10*6/MM3 (ref 4.14–5.8)
SODIUM SERPL-SCNC: 139 MMOL/L (ref 136–145)
WBC # BLD AUTO: 7.37 10*3/MM3 (ref 3.4–10.8)

## 2020-11-09 PROCEDURE — 82550 ASSAY OF CK (CPK): CPT | Performed by: HOSPITALIST

## 2020-11-09 PROCEDURE — 25010000002 ENOXAPARIN PER 10 MG: Performed by: NURSE PRACTITIONER

## 2020-11-09 PROCEDURE — 86140 C-REACTIVE PROTEIN: CPT | Performed by: HOSPITALIST

## 2020-11-09 PROCEDURE — 82728 ASSAY OF FERRITIN: CPT | Performed by: HOSPITALIST

## 2020-11-09 PROCEDURE — 85379 FIBRIN DEGRADATION QUANT: CPT | Performed by: HOSPITALIST

## 2020-11-09 PROCEDURE — 80053 COMPREHEN METABOLIC PANEL: CPT | Performed by: HOSPITALIST

## 2020-11-09 PROCEDURE — 82248 BILIRUBIN DIRECT: CPT | Performed by: HOSPITALIST

## 2020-11-09 PROCEDURE — 85384 FIBRINOGEN ACTIVITY: CPT | Performed by: HOSPITALIST

## 2020-11-09 PROCEDURE — 83615 LACTATE (LD) (LDH) ENZYME: CPT | Performed by: HOSPITALIST

## 2020-11-09 PROCEDURE — 85025 COMPLETE CBC W/AUTO DIFF WBC: CPT | Performed by: HOSPITALIST

## 2020-11-09 PROCEDURE — 63710000001 DEXAMETHASONE PER 0.25 MG: Performed by: HOSPITALIST

## 2020-11-09 RX ORDER — OXYCODONE HYDROCHLORIDE AND ACETAMINOPHEN 5; 325 MG/1; MG/1
1 TABLET ORAL EVERY 6 HOURS PRN
Qty: 12 TABLET | Refills: 0 | Status: SHIPPED | OUTPATIENT
Start: 2020-11-09

## 2020-11-09 RX ADMIN — DEXAMETHASONE 6 MG: 4 TABLET ORAL at 09:02

## 2020-11-09 RX ADMIN — SODIUM CHLORIDE, PRESERVATIVE FREE 10 ML: 5 INJECTION INTRAVENOUS at 09:02

## 2020-11-09 RX ADMIN — ENOXAPARIN SODIUM 40 MG: 40 INJECTION SUBCUTANEOUS at 09:02

## 2020-11-09 RX ADMIN — LANSOPRAZOLE 30 MG: KIT at 09:02

## 2020-11-09 RX ADMIN — Medication 5000 UNITS: at 09:02

## 2020-11-09 RX ADMIN — DEXTROSE MONOHYDRATE 50 ML/HR: 50 INJECTION, SOLUTION INTRAVENOUS at 09:06

## 2020-11-09 RX ADMIN — Medication: at 09:03

## 2020-11-09 NOTE — PLAN OF CARE
Problem: Adult Inpatient Plan of Care  Goal: Plan of Care Review  Outcome: Ongoing, Progressing  Flowsheets  Taken 11/9/2020 0542 by Sophie Ortiz, RN  Plan of Care Reviewed With: patient  Taken 11/6/2020 1720 by Ivana King, RN  Outcome Summary: Pt is non-verbal, able to follow simple commands, shakes/nods head appropriately to answer questions. Denies pain, discomfort. On RA. Coughing independently, nonproductive. Lungs sound rhonchi, diminished. Turn Q2H. Tube feeds administered as ordered. Skin care provided. Leaking suprapubic cath. Cont. remdesivir and decadron. Monitor labs. Plans to return to facility soon. Spoken with sister about care. No new orders at this time, continue to monitor.

## 2020-11-09 NOTE — PROGRESS NOTES
Continued Stay Note  [facility]     Patient Name: Manoj Jordan  MRN: [id number]  Today's Date: 11/9/2020    Admit Date: 11/3/2020    Discharge Plan     Row Name 11/09/20 1224       Plan    Plan Comments  DC orders noted.  Spoke with [facility] who confirms pt can return today.  CCP spoke with pt's sister Reina Ragsdale who is in agreement with DC back to [facility] today.  Rx x1 copied and placed in pkt.  DC summary and DC packet given to RN.  [facility] arranged for today at 1800.    Final Note  DC to IC level bed at [facility].        Discharge Codes    No documentation.       Expected Discharge Date and Time     Expected Discharge Date Expected Discharge Time    Nov 9, 2020             Reyna Morley RN

## 2022-01-01 ENCOUNTER — APPOINTMENT (OUTPATIENT)
Dept: GENERAL RADIOLOGY | Facility: HOSPITAL | Age: 67
End: 2022-01-01

## 2022-01-01 ENCOUNTER — HOSPITAL ENCOUNTER (INPATIENT)
Facility: HOSPITAL | Age: 67
LOS: 2 days | End: 2022-01-23
Attending: INTERNAL MEDICINE | Admitting: INTERNAL MEDICINE

## 2022-01-01 ENCOUNTER — HOSPITAL ENCOUNTER (INPATIENT)
Facility: HOSPITAL | Age: 67
LOS: 2 days | Discharge: HOSPICE | End: 2022-01-21
Attending: EMERGENCY MEDICINE | Admitting: PSYCHIATRY & NEUROLOGY

## 2022-01-01 VITALS — OXYGEN SATURATION: 87 % | TEMPERATURE: 98.5 F | DIASTOLIC BLOOD PRESSURE: 66 MMHG | SYSTOLIC BLOOD PRESSURE: 103 MMHG

## 2022-01-01 VITALS
BODY MASS INDEX: 19.16 KG/M2 | RESPIRATION RATE: 16 BRPM | SYSTOLIC BLOOD PRESSURE: 123 MMHG | OXYGEN SATURATION: 84 % | DIASTOLIC BLOOD PRESSURE: 66 MMHG | HEART RATE: 109 BPM | TEMPERATURE: 101.3 F | WEIGHT: 115 LBS | HEIGHT: 65 IN

## 2022-01-01 DIAGNOSIS — J69.0 ASPIRATION PNEUMONIA DUE TO GASTRIC SECRETIONS, UNSPECIFIED LATERALITY, UNSPECIFIED PART OF LUNG: Primary | ICD-10-CM

## 2022-01-01 DIAGNOSIS — Z86.74 H/O CARDIAC ARREST: ICD-10-CM

## 2022-01-01 DIAGNOSIS — G93.1 ANOXIC BRAIN INJURY: ICD-10-CM

## 2022-01-01 LAB
ALBUMIN SERPL-MCNC: 3.6 G/DL (ref 3.5–5.2)
ALBUMIN/GLOB SERPL: 1 G/DL
ALP SERPL-CCNC: 86 U/L (ref 39–117)
ALT SERPL W P-5'-P-CCNC: 32 U/L (ref 1–41)
ANION GAP SERPL CALCULATED.3IONS-SCNC: 5.9 MMOL/L (ref 5–15)
AST SERPL-CCNC: 39 U/L (ref 1–40)
B PARAPERT DNA SPEC QL NAA+PROBE: NOT DETECTED
B PERT DNA SPEC QL NAA+PROBE: NOT DETECTED
BASOPHILS # BLD AUTO: 0.08 10*3/MM3 (ref 0–0.2)
BASOPHILS NFR BLD AUTO: 0.5 % (ref 0–1.5)
BILIRUB SERPL-MCNC: 0.8 MG/DL (ref 0–1.2)
BUN SERPL-MCNC: 18 MG/DL (ref 8–23)
BUN/CREAT SERPL: 20.7 (ref 7–25)
C PNEUM DNA NPH QL NAA+NON-PROBE: NOT DETECTED
CALCIUM SPEC-SCNC: 9 MG/DL (ref 8.6–10.5)
CHLORIDE SERPL-SCNC: 108 MMOL/L (ref 98–107)
CO2 SERPL-SCNC: 30.1 MMOL/L (ref 22–29)
CREAT SERPL-MCNC: 0.87 MG/DL (ref 0.76–1.27)
DEPRECATED RDW RBC AUTO: 44.9 FL (ref 37–54)
EOSINOPHIL # BLD AUTO: 0.11 10*3/MM3 (ref 0–0.4)
EOSINOPHIL NFR BLD AUTO: 0.7 % (ref 0.3–6.2)
ERYTHROCYTE [DISTWIDTH] IN BLOOD BY AUTOMATED COUNT: 12.7 % (ref 12.3–15.4)
FLUAV SUBTYP SPEC NAA+PROBE: NOT DETECTED
FLUBV RNA ISLT QL NAA+PROBE: NOT DETECTED
GFR SERPL CREATININE-BSD FRML MDRD: 88 ML/MIN/1.73
GLOBULIN UR ELPH-MCNC: 3.7 GM/DL
GLUCOSE SERPL-MCNC: 214 MG/DL (ref 65–99)
HADV DNA SPEC NAA+PROBE: NOT DETECTED
HCOV 229E RNA SPEC QL NAA+PROBE: NOT DETECTED
HCOV HKU1 RNA SPEC QL NAA+PROBE: NOT DETECTED
HCOV NL63 RNA SPEC QL NAA+PROBE: NOT DETECTED
HCOV OC43 RNA SPEC QL NAA+PROBE: NOT DETECTED
HCT VFR BLD AUTO: 43.5 % (ref 37.5–51)
HGB BLD-MCNC: 13.9 G/DL (ref 13–17.7)
HMPV RNA NPH QL NAA+NON-PROBE: NOT DETECTED
HPIV1 RNA ISLT QL NAA+PROBE: NOT DETECTED
HPIV2 RNA SPEC QL NAA+PROBE: NOT DETECTED
HPIV3 RNA NPH QL NAA+PROBE: NOT DETECTED
HPIV4 P GENE NPH QL NAA+PROBE: NOT DETECTED
IMM GRANULOCYTES # BLD AUTO: 0.08 10*3/MM3 (ref 0–0.05)
IMM GRANULOCYTES NFR BLD AUTO: 0.5 % (ref 0–0.5)
LIPASE SERPL-CCNC: 43 U/L (ref 13–60)
LYMPHOCYTES # BLD AUTO: 0.84 10*3/MM3 (ref 0.7–3.1)
LYMPHOCYTES NFR BLD AUTO: 5.5 % (ref 19.6–45.3)
M PNEUMO IGG SER IA-ACNC: NOT DETECTED
MCH RBC QN AUTO: 31.2 PG (ref 26.6–33)
MCHC RBC AUTO-ENTMCNC: 32 G/DL (ref 31.5–35.7)
MCV RBC AUTO: 97.8 FL (ref 79–97)
MONOCYTES # BLD AUTO: 1.11 10*3/MM3 (ref 0.1–0.9)
MONOCYTES NFR BLD AUTO: 7.3 % (ref 5–12)
NEUTROPHILS NFR BLD AUTO: 12.96 10*3/MM3 (ref 1.7–7)
NEUTROPHILS NFR BLD AUTO: 85.5 % (ref 42.7–76)
NRBC BLD AUTO-RTO: 0.1 /100 WBC (ref 0–0.2)
PLATELET # BLD AUTO: 203 10*3/MM3 (ref 140–450)
PMV BLD AUTO: 11.5 FL (ref 6–12)
POTASSIUM SERPL-SCNC: 4.4 MMOL/L (ref 3.5–5.2)
PROCALCITONIN SERPL-MCNC: 0.15 NG/ML (ref 0–0.25)
PROT SERPL-MCNC: 7.3 G/DL (ref 6–8.5)
QT INTERVAL: 326 MS
RBC # BLD AUTO: 4.45 10*6/MM3 (ref 4.14–5.8)
RHINOVIRUS RNA SPEC NAA+PROBE: NOT DETECTED
RSV RNA NPH QL NAA+NON-PROBE: NOT DETECTED
SARS-COV-2 RNA NPH QL NAA+NON-PROBE: NOT DETECTED
SODIUM SERPL-SCNC: 144 MMOL/L (ref 136–145)
WBC NRBC COR # BLD: 15.18 10*3/MM3 (ref 3.4–10.8)

## 2022-01-01 PROCEDURE — 0202U NFCT DS 22 TRGT SARS-COV-2: CPT | Performed by: NURSE PRACTITIONER

## 2022-01-01 PROCEDURE — 25010000002 LORAZEPAM PER 2 MG: Performed by: INTERNAL MEDICINE

## 2022-01-01 PROCEDURE — 93005 ELECTROCARDIOGRAM TRACING: CPT | Performed by: NURSE PRACTITIONER

## 2022-01-01 PROCEDURE — 25010000002 MORPHINE PER 10 MG: Performed by: INTERNAL MEDICINE

## 2022-01-01 PROCEDURE — 99239 HOSP IP/OBS DSCHRG MGMT >30: CPT | Performed by: INTERNAL MEDICINE

## 2022-01-01 PROCEDURE — 99221 1ST HOSP IP/OBS SF/LOW 40: CPT | Performed by: INTERNAL MEDICINE

## 2022-01-01 PROCEDURE — 99285 EMERGENCY DEPT VISIT HI MDM: CPT

## 2022-01-01 PROCEDURE — 80053 COMPREHEN METABOLIC PANEL: CPT | Performed by: NURSE PRACTITIONER

## 2022-01-01 PROCEDURE — 94760 N-INVAS EAR/PLS OXIMETRY 1: CPT

## 2022-01-01 PROCEDURE — 83690 ASSAY OF LIPASE: CPT | Performed by: NURSE PRACTITIONER

## 2022-01-01 PROCEDURE — 25010000002 CEFTRIAXONE PER 250 MG: Performed by: NURSE PRACTITIONER

## 2022-01-01 PROCEDURE — 94799 UNLISTED PULMONARY SVC/PX: CPT

## 2022-01-01 PROCEDURE — 99222 1ST HOSP IP/OBS MODERATE 55: CPT | Performed by: INTERNAL MEDICINE

## 2022-01-01 PROCEDURE — 94640 AIRWAY INHALATION TREATMENT: CPT

## 2022-01-01 PROCEDURE — 71045 X-RAY EXAM CHEST 1 VIEW: CPT

## 2022-01-01 PROCEDURE — 99231 SBSQ HOSP IP/OBS SF/LOW 25: CPT | Performed by: INTERNAL MEDICINE

## 2022-01-01 PROCEDURE — 84145 PROCALCITONIN (PCT): CPT | Performed by: NURSE PRACTITIONER

## 2022-01-01 PROCEDURE — 93010 ELECTROCARDIOGRAM REPORT: CPT | Performed by: INTERNAL MEDICINE

## 2022-01-01 PROCEDURE — 85025 COMPLETE CBC W/AUTO DIFF WBC: CPT | Performed by: NURSE PRACTITIONER

## 2022-01-01 PROCEDURE — 99238 HOSP IP/OBS DSCHRG MGMT 30/<: CPT | Performed by: INTERNAL MEDICINE

## 2022-01-01 RX ORDER — ACETAMINOPHEN 325 MG/1
650 TABLET ORAL EVERY 4 HOURS PRN
Status: DISCONTINUED | OUTPATIENT
Start: 2022-01-01 | End: 2022-01-01 | Stop reason: HOSPADM

## 2022-01-01 RX ORDER — GLYCOPYRROLATE 0.2 MG/ML
0.2 INJECTION INTRAMUSCULAR; INTRAVENOUS
Status: DISCONTINUED | OUTPATIENT
Start: 2022-01-01 | End: 2022-01-01

## 2022-01-01 RX ORDER — GLYCOPYRROLATE 0.2 MG/ML
0.4 INJECTION INTRAMUSCULAR; INTRAVENOUS
Status: DISCONTINUED | OUTPATIENT
Start: 2022-01-01 | End: 2022-01-01

## 2022-01-01 RX ORDER — LORAZEPAM 2 MG/ML
0.5 CONCENTRATE ORAL
Status: DISCONTINUED | OUTPATIENT
Start: 2022-01-01 | End: 2022-01-01 | Stop reason: HOSPADM

## 2022-01-01 RX ORDER — GLYCOPYRROLATE 0.2 MG/ML
0.8 INJECTION INTRAMUSCULAR; INTRAVENOUS
Status: DISCONTINUED | OUTPATIENT
Start: 2022-01-01 | End: 2022-01-01 | Stop reason: HOSPADM

## 2022-01-01 RX ORDER — MORPHINE SULFATE 4 MG/ML
4 INJECTION, SOLUTION INTRAMUSCULAR; INTRAVENOUS
Status: CANCELLED | OUTPATIENT
Start: 2022-01-01 | End: 2022-01-26

## 2022-01-01 RX ORDER — SCOLOPAMINE TRANSDERMAL SYSTEM 1 MG/1
1 PATCH, EXTENDED RELEASE TRANSDERMAL
Status: CANCELLED | OUTPATIENT
Start: 2022-01-01

## 2022-01-01 RX ORDER — MORPHINE SULFATE 20 MG/ML
5 SOLUTION ORAL
Status: DISCONTINUED | OUTPATIENT
Start: 2022-01-01 | End: 2022-01-01 | Stop reason: HOSPADM

## 2022-01-01 RX ORDER — HYDROMORPHONE HCL 110MG/55ML
1.5 PATIENT CONTROLLED ANALGESIA SYRINGE INTRAVENOUS
Status: DISCONTINUED | OUTPATIENT
Start: 2022-01-01 | End: 2022-01-01 | Stop reason: HOSPADM

## 2022-01-01 RX ORDER — LORAZEPAM 2 MG/ML
0.5 INJECTION INTRAMUSCULAR
Status: DISCONTINUED | OUTPATIENT
Start: 2022-01-01 | End: 2022-01-01 | Stop reason: HOSPADM

## 2022-01-01 RX ORDER — LORAZEPAM 2 MG/ML
2 CONCENTRATE ORAL
Status: DISCONTINUED | OUTPATIENT
Start: 2022-01-01 | End: 2022-01-01 | Stop reason: HOSPADM

## 2022-01-01 RX ORDER — ACETAMINOPHEN 650 MG/1
650 SUPPOSITORY RECTAL EVERY 4 HOURS PRN
Status: CANCELLED | OUTPATIENT
Start: 2022-01-01

## 2022-01-01 RX ORDER — LORAZEPAM 2 MG/ML
1 CONCENTRATE ORAL
Status: CANCELLED | OUTPATIENT
Start: 2022-01-01 | End: 2022-01-26

## 2022-01-01 RX ORDER — MORPHINE SULFATE 20 MG/ML
10 SOLUTION ORAL
Status: DISCONTINUED | OUTPATIENT
Start: 2022-01-01 | End: 2022-01-01 | Stop reason: HOSPADM

## 2022-01-01 RX ORDER — ACETAMINOPHEN 650 MG/1
650 SUPPOSITORY RECTAL EVERY 4 HOURS PRN
Status: DISCONTINUED | OUTPATIENT
Start: 2022-01-01 | End: 2022-01-01 | Stop reason: HOSPADM

## 2022-01-01 RX ORDER — MORPHINE SULFATE 4 MG/ML
4 INJECTION, SOLUTION INTRAMUSCULAR; INTRAVENOUS
Status: DISCONTINUED | OUTPATIENT
Start: 2022-01-01 | End: 2022-01-01 | Stop reason: HOSPADM

## 2022-01-01 RX ORDER — ALBUTEROL SULFATE 2.5 MG/3ML
2.5 SOLUTION RESPIRATORY (INHALATION)
Status: COMPLETED | OUTPATIENT
Start: 2022-01-01 | End: 2022-01-01

## 2022-01-01 RX ORDER — ACETAMINOPHEN 160 MG/5ML
650 SOLUTION ORAL EVERY 4 HOURS PRN
Status: DISCONTINUED | OUTPATIENT
Start: 2022-01-01 | End: 2022-01-01 | Stop reason: HOSPADM

## 2022-01-01 RX ORDER — GLYCOPYRROLATE 0.2 MG/ML
0.4 INJECTION INTRAMUSCULAR; INTRAVENOUS
Status: CANCELLED | OUTPATIENT
Start: 2022-01-01

## 2022-01-01 RX ORDER — MORPHINE SULFATE 2 MG/ML
2 INJECTION, SOLUTION INTRAMUSCULAR; INTRAVENOUS
Status: DISCONTINUED | OUTPATIENT
Start: 2022-01-01 | End: 2022-01-01

## 2022-01-01 RX ORDER — LORAZEPAM 2 MG/ML
1 CONCENTRATE ORAL
Status: DISCONTINUED | OUTPATIENT
Start: 2022-01-01 | End: 2022-01-01 | Stop reason: HOSPADM

## 2022-01-01 RX ORDER — LORAZEPAM 2 MG/ML
2 CONCENTRATE ORAL
Status: CANCELLED | OUTPATIENT
Start: 2022-01-01 | End: 2022-01-26

## 2022-01-01 RX ORDER — GLYCOPYRROLATE 0.2 MG/ML
0.8 INJECTION INTRAMUSCULAR; INTRAVENOUS ONCE
Status: DISCONTINUED | OUTPATIENT
Start: 2022-01-01 | End: 2022-01-01

## 2022-01-01 RX ORDER — MORPHINE SULFATE 2 MG/ML
2 INJECTION, SOLUTION INTRAMUSCULAR; INTRAVENOUS
Status: DISCONTINUED | OUTPATIENT
Start: 2022-01-01 | End: 2022-01-01 | Stop reason: HOSPADM

## 2022-01-01 RX ORDER — KETOROLAC TROMETHAMINE 15 MG/ML
15 INJECTION, SOLUTION INTRAMUSCULAR; INTRAVENOUS EVERY 6 HOURS PRN
Status: DISCONTINUED | OUTPATIENT
Start: 2022-01-01 | End: 2022-01-01

## 2022-01-01 RX ORDER — MORPHINE SULFATE 10 MG/ML
6 INJECTION INTRAMUSCULAR; INTRAVENOUS; SUBCUTANEOUS
Status: DISCONTINUED | OUTPATIENT
Start: 2022-01-01 | End: 2022-01-01 | Stop reason: HOSPADM

## 2022-01-01 RX ORDER — HYDROMORPHONE HCL 110MG/55ML
1.5 PATIENT CONTROLLED ANALGESIA SYRINGE INTRAVENOUS
Status: CANCELLED | OUTPATIENT
Start: 2022-01-01 | End: 2022-01-26

## 2022-01-01 RX ORDER — MORPHINE SULFATE 10 MG/ML
6 INJECTION INTRAMUSCULAR; INTRAVENOUS; SUBCUTANEOUS
Status: CANCELLED | OUTPATIENT
Start: 2022-01-01 | End: 2022-01-26

## 2022-01-01 RX ORDER — LORAZEPAM 2 MG/ML
1 INJECTION INTRAMUSCULAR
Status: CANCELLED | OUTPATIENT
Start: 2022-01-01 | End: 2022-01-26

## 2022-01-01 RX ORDER — MORPHINE SULFATE 20 MG/ML
20 SOLUTION ORAL
Status: CANCELLED | OUTPATIENT
Start: 2022-01-01 | End: 2022-01-26

## 2022-01-01 RX ORDER — LORAZEPAM 2 MG/ML
1 INJECTION INTRAMUSCULAR
Status: DISCONTINUED | OUTPATIENT
Start: 2022-01-01 | End: 2022-01-01 | Stop reason: HOSPADM

## 2022-01-01 RX ORDER — LORAZEPAM 2 MG/ML
2 INJECTION INTRAMUSCULAR
Status: DISCONTINUED | OUTPATIENT
Start: 2022-01-01 | End: 2022-01-01 | Stop reason: HOSPADM

## 2022-01-01 RX ORDER — GLYCOPYRROLATE 0.2 MG/ML
0.8 INJECTION INTRAMUSCULAR; INTRAVENOUS EVERY 4 HOURS PRN
Status: DISCONTINUED | OUTPATIENT
Start: 2022-01-01 | End: 2022-01-01 | Stop reason: HOSPADM

## 2022-01-01 RX ORDER — MORPHINE SULFATE 20 MG/ML
20 SOLUTION ORAL
Status: DISCONTINUED | OUTPATIENT
Start: 2022-01-01 | End: 2022-01-01 | Stop reason: HOSPADM

## 2022-01-01 RX ORDER — SODIUM CHLORIDE 0.9 % (FLUSH) 0.9 %
10 SYRINGE (ML) INJECTION AS NEEDED
Status: DISCONTINUED | OUTPATIENT
Start: 2022-01-01 | End: 2022-01-01 | Stop reason: HOSPADM

## 2022-01-01 RX ORDER — MORPHINE SULFATE 20 MG/ML
10 SOLUTION ORAL
Status: CANCELLED | OUTPATIENT
Start: 2022-01-01 | End: 2022-01-26

## 2022-01-01 RX ORDER — SCOLOPAMINE TRANSDERMAL SYSTEM 1 MG/1
1 PATCH, EXTENDED RELEASE TRANSDERMAL
Status: DISCONTINUED | OUTPATIENT
Start: 2022-01-01 | End: 2022-01-01 | Stop reason: HOSPADM

## 2022-01-01 RX ORDER — LORAZEPAM 2 MG/ML
2 INJECTION INTRAMUSCULAR
Status: DISCONTINUED | OUTPATIENT
Start: 2022-01-01 | End: 2022-01-01

## 2022-01-01 RX ORDER — LORAZEPAM 2 MG/ML
2 INJECTION INTRAMUSCULAR
Status: CANCELLED | OUTPATIENT
Start: 2022-01-01 | End: 2022-01-26

## 2022-01-01 RX ORDER — LORAZEPAM 2 MG/ML
1 INJECTION INTRAMUSCULAR
Status: DISCONTINUED | OUTPATIENT
Start: 2022-01-01 | End: 2022-01-01

## 2022-01-01 RX ORDER — LORAZEPAM 2 MG/ML
0.5 INJECTION INTRAMUSCULAR
Status: CANCELLED | OUTPATIENT
Start: 2022-01-01 | End: 2022-01-26

## 2022-01-01 RX ORDER — MORPHINE SULFATE 20 MG/ML
5 SOLUTION ORAL
Status: CANCELLED | OUTPATIENT
Start: 2022-01-01 | End: 2022-01-24

## 2022-01-01 RX ORDER — SODIUM CHLORIDE 0.9 % (FLUSH) 0.9 %
10 SYRINGE (ML) INJECTION AS NEEDED
Status: CANCELLED | OUTPATIENT
Start: 2022-01-01

## 2022-01-01 RX ORDER — ACETAMINOPHEN 325 MG/1
650 TABLET ORAL EVERY 4 HOURS PRN
Status: CANCELLED | OUTPATIENT
Start: 2022-01-01

## 2022-01-01 RX ORDER — ACETAMINOPHEN 160 MG/5ML
650 SOLUTION ORAL EVERY 4 HOURS PRN
Status: CANCELLED | OUTPATIENT
Start: 2022-01-01

## 2022-01-01 RX ORDER — GLYCOPYRROLATE 0.2 MG/ML
0.4 INJECTION INTRAMUSCULAR; INTRAVENOUS
Status: DISCONTINUED | OUTPATIENT
Start: 2022-01-01 | End: 2022-01-01 | Stop reason: HOSPADM

## 2022-01-01 RX ORDER — GLYCOPYRROLATE 0.2 MG/ML
0.8 INJECTION INTRAMUSCULAR; INTRAVENOUS
Status: CANCELLED | OUTPATIENT
Start: 2022-01-01

## 2022-01-01 RX ORDER — MORPHINE SULFATE 20 MG/ML
5 SOLUTION ORAL
Status: DISCONTINUED | OUTPATIENT
Start: 2022-01-01 | End: 2022-01-01

## 2022-01-01 RX ORDER — GLYCOPYRROLATE 0.2 MG/ML
0.8 INJECTION INTRAMUSCULAR; INTRAVENOUS EVERY 4 HOURS PRN
Status: CANCELLED | OUTPATIENT
Start: 2022-01-01

## 2022-01-01 RX ORDER — MORPHINE SULFATE 2 MG/ML
2 INJECTION, SOLUTION INTRAMUSCULAR; INTRAVENOUS
Status: CANCELLED | OUTPATIENT
Start: 2022-01-01 | End: 2022-01-24

## 2022-01-01 RX ORDER — LORAZEPAM 2 MG/ML
0.5 INJECTION INTRAMUSCULAR
Status: DISCONTINUED | OUTPATIENT
Start: 2022-01-01 | End: 2022-01-01

## 2022-01-01 RX ORDER — LORAZEPAM 2 MG/ML
0.5 CONCENTRATE ORAL
Status: CANCELLED | OUTPATIENT
Start: 2022-01-01 | End: 2022-01-26

## 2022-01-01 RX ADMIN — MORPHINE SULFATE 4 MG: 4 INJECTION, SOLUTION INTRAMUSCULAR; INTRAVENOUS at 08:16

## 2022-01-01 RX ADMIN — LORAZEPAM 2 MG: 2 INJECTION INTRAMUSCULAR; INTRAVENOUS at 12:31

## 2022-01-01 RX ADMIN — GLYCOPYRROLATE 0.8 MG: 0.2 INJECTION INTRAMUSCULAR; INTRAVENOUS at 08:08

## 2022-01-01 RX ADMIN — MORPHINE SULFATE 4 MG: 4 INJECTION, SOLUTION INTRAMUSCULAR; INTRAVENOUS at 19:11

## 2022-01-01 RX ADMIN — MORPHINE SULFATE 4 MG: 4 INJECTION, SOLUTION INTRAMUSCULAR; INTRAVENOUS at 20:07

## 2022-01-01 RX ADMIN — GLYCOPYRROLATE 0.8 MG: 0.2 INJECTION INTRAMUSCULAR; INTRAVENOUS at 01:01

## 2022-01-01 RX ADMIN — LORAZEPAM 2 MG: 2 INJECTION INTRAMUSCULAR; INTRAVENOUS at 04:25

## 2022-01-01 RX ADMIN — LORAZEPAM 2 MG: 2 INJECTION INTRAMUSCULAR; INTRAVENOUS at 00:56

## 2022-01-01 RX ADMIN — LORAZEPAM 2 MG: 2 INJECTION INTRAMUSCULAR; INTRAVENOUS at 20:42

## 2022-01-01 RX ADMIN — MORPHINE SULFATE 4 MG: 4 INJECTION, SOLUTION INTRAMUSCULAR; INTRAVENOUS at 08:57

## 2022-01-01 RX ADMIN — MORPHINE SULFATE 4 MG: 4 INJECTION, SOLUTION INTRAMUSCULAR; INTRAVENOUS at 09:17

## 2022-01-01 RX ADMIN — GLYCOPYRROLATE 0.4 MG: 0.2 INJECTION INTRAMUSCULAR; INTRAVENOUS at 08:16

## 2022-01-01 RX ADMIN — GLYCOPYRROLATE 0.4 MG: 0.2 INJECTION INTRAMUSCULAR; INTRAVENOUS at 16:25

## 2022-01-01 RX ADMIN — GLYCOPYRROLATE 0.4 MG: 0.2 INJECTION INTRAMUSCULAR; INTRAVENOUS at 04:19

## 2022-01-01 RX ADMIN — MORPHINE SULFATE 4 MG: 4 INJECTION, SOLUTION INTRAMUSCULAR; INTRAVENOUS at 08:08

## 2022-01-01 RX ADMIN — LORAZEPAM 2 MG: 2 INJECTION INTRAMUSCULAR; INTRAVENOUS at 08:57

## 2022-01-01 RX ADMIN — GLYCOPYRROLATE 0.4 MG: 0.2 INJECTION INTRAMUSCULAR; INTRAVENOUS at 09:17

## 2022-01-01 RX ADMIN — MORPHINE SULFATE 4 MG: 4 INJECTION, SOLUTION INTRAMUSCULAR; INTRAVENOUS at 12:32

## 2022-01-01 RX ADMIN — MORPHINE SULFATE 4 MG: 4 INJECTION, SOLUTION INTRAMUSCULAR; INTRAVENOUS at 17:14

## 2022-01-01 RX ADMIN — LORAZEPAM 2 MG: 2 INJECTION INTRAMUSCULAR; INTRAVENOUS at 00:57

## 2022-01-01 RX ADMIN — LORAZEPAM 2 MG: 2 INJECTION INTRAMUSCULAR; INTRAVENOUS at 16:24

## 2022-01-01 RX ADMIN — LORAZEPAM 2 MG: 2 INJECTION INTRAMUSCULAR; INTRAVENOUS at 20:52

## 2022-01-01 RX ADMIN — LORAZEPAM 2 MG: 2 INJECTION INTRAMUSCULAR; INTRAVENOUS at 18:13

## 2022-01-01 RX ADMIN — GLYCOPYRROLATE 0.4 MG: 0.2 INJECTION INTRAMUSCULAR; INTRAVENOUS at 00:56

## 2022-01-01 RX ADMIN — MORPHINE SULFATE 4 MG: 4 INJECTION, SOLUTION INTRAMUSCULAR; INTRAVENOUS at 20:42

## 2022-01-01 RX ADMIN — GLYCOPYRROLATE 0.4 MG: 0.2 INJECTION INTRAMUSCULAR; INTRAVENOUS at 04:25

## 2022-01-01 RX ADMIN — LORAZEPAM 2 MG: 2 INJECTION INTRAMUSCULAR; INTRAVENOUS at 08:16

## 2022-01-01 RX ADMIN — MORPHINE SULFATE 4 MG: 4 INJECTION, SOLUTION INTRAMUSCULAR; INTRAVENOUS at 18:13

## 2022-01-01 RX ADMIN — MORPHINE SULFATE 4 MG: 4 INJECTION, SOLUTION INTRAMUSCULAR; INTRAVENOUS at 04:25

## 2022-01-01 RX ADMIN — GLYCOPYRROLATE 0.8 MG: 0.2 INJECTION INTRAMUSCULAR; INTRAVENOUS at 04:41

## 2022-01-01 RX ADMIN — MORPHINE SULFATE 4 MG: 4 INJECTION, SOLUTION INTRAMUSCULAR; INTRAVENOUS at 04:41

## 2022-01-01 RX ADMIN — ALBUTEROL SULFATE 2.5 MG: 2.5 SOLUTION RESPIRATORY (INHALATION) at 11:52

## 2022-01-01 RX ADMIN — GLYCOPYRROLATE 0.4 MG: 0.2 INJECTION INTRAMUSCULAR; INTRAVENOUS at 20:52

## 2022-01-01 RX ADMIN — GLYCOPYRROLATE 0.4 MG: 0.2 INJECTION INTRAMUSCULAR; INTRAVENOUS at 17:14

## 2022-01-01 RX ADMIN — GLYCOPYRROLATE 0.4 MG: 0.2 INJECTION INTRAMUSCULAR; INTRAVENOUS at 13:37

## 2022-01-01 RX ADMIN — MORPHINE SULFATE 4 MG: 4 INJECTION, SOLUTION INTRAMUSCULAR; INTRAVENOUS at 01:01

## 2022-01-01 RX ADMIN — MORPHINE SULFATE 4 MG: 4 INJECTION, SOLUTION INTRAMUSCULAR; INTRAVENOUS at 00:57

## 2022-01-01 RX ADMIN — LORAZEPAM 1 MG: 2 INJECTION INTRAMUSCULAR; INTRAVENOUS at 19:11

## 2022-01-01 RX ADMIN — MORPHINE SULFATE 4 MG: 4 INJECTION, SOLUTION INTRAMUSCULAR; INTRAVENOUS at 12:07

## 2022-01-01 RX ADMIN — LORAZEPAM 2 MG: 2 INJECTION INTRAMUSCULAR; INTRAVENOUS at 01:01

## 2022-01-01 RX ADMIN — GLYCOPYRROLATE 0.4 MG: 0.2 INJECTION INTRAMUSCULAR; INTRAVENOUS at 18:13

## 2022-01-01 RX ADMIN — GLYCOPYRROLATE 0.4 MG: 0.2 INJECTION INTRAMUSCULAR; INTRAVENOUS at 19:11

## 2022-01-01 RX ADMIN — GLYCOPYRROLATE 0.4 MG: 0.2 INJECTION INTRAMUSCULAR; INTRAVENOUS at 17:30

## 2022-01-01 RX ADMIN — LORAZEPAM 2 MG: 2 INJECTION INTRAMUSCULAR; INTRAVENOUS at 05:02

## 2022-01-01 RX ADMIN — GLYCOPYRROLATE 0.4 MG: 0.2 INJECTION INTRAMUSCULAR; INTRAVENOUS at 08:56

## 2022-01-01 RX ADMIN — LORAZEPAM 2 MG: 2 INJECTION INTRAMUSCULAR; INTRAVENOUS at 12:06

## 2022-01-01 RX ADMIN — LORAZEPAM 2 MG: 2 INJECTION INTRAMUSCULAR; INTRAVENOUS at 04:19

## 2022-01-01 RX ADMIN — MORPHINE SULFATE 4 MG: 4 INJECTION, SOLUTION INTRAMUSCULAR; INTRAVENOUS at 16:25

## 2022-01-01 RX ADMIN — LORAZEPAM 2 MG: 2 INJECTION INTRAMUSCULAR; INTRAVENOUS at 04:41

## 2022-01-01 RX ADMIN — MORPHINE SULFATE 4 MG: 4 INJECTION, SOLUTION INTRAMUSCULAR; INTRAVENOUS at 20:52

## 2022-01-01 RX ADMIN — MORPHINE SULFATE 4 MG: 4 INJECTION, SOLUTION INTRAMUSCULAR; INTRAVENOUS at 00:17

## 2022-01-01 RX ADMIN — LORAZEPAM 2 MG: 2 INJECTION INTRAMUSCULAR; INTRAVENOUS at 20:06

## 2022-01-01 RX ADMIN — GLYCOPYRROLATE 0.4 MG: 0.2 INJECTION INTRAMUSCULAR; INTRAVENOUS at 12:06

## 2022-01-01 RX ADMIN — MORPHINE SULFATE 4 MG: 4 INJECTION, SOLUTION INTRAMUSCULAR; INTRAVENOUS at 17:30

## 2022-01-01 RX ADMIN — GLYCOPYRROLATE 0.8 MG: 0.2 INJECTION INTRAMUSCULAR; INTRAVENOUS at 20:42

## 2022-01-01 RX ADMIN — GLYCOPYRROLATE 0.4 MG: 0.2 INJECTION INTRAMUSCULAR; INTRAVENOUS at 12:31

## 2022-01-01 RX ADMIN — LORAZEPAM 2 MG: 2 INJECTION INTRAMUSCULAR; INTRAVENOUS at 13:37

## 2022-01-01 RX ADMIN — LORAZEPAM 2 MG: 2 INJECTION INTRAMUSCULAR; INTRAVENOUS at 08:08

## 2022-01-01 RX ADMIN — MORPHINE SULFATE 4 MG: 4 INJECTION, SOLUTION INTRAMUSCULAR; INTRAVENOUS at 13:36

## 2022-01-01 RX ADMIN — CEFTRIAXONE 1 G: 1 INJECTION, POWDER, FOR SOLUTION INTRAMUSCULAR; INTRAVENOUS at 12:28

## 2022-01-01 RX ADMIN — MORPHINE SULFATE 4 MG: 4 INJECTION, SOLUTION INTRAMUSCULAR; INTRAVENOUS at 05:02

## 2022-01-01 RX ADMIN — MORPHINE SULFATE 4 MG: 4 INJECTION, SOLUTION INTRAMUSCULAR; INTRAVENOUS at 04:19

## 2022-01-01 RX ADMIN — ALBUTEROL SULFATE 2.5 MG: 2.5 SOLUTION RESPIRATORY (INHALATION) at 12:31

## 2022-01-01 RX ADMIN — LORAZEPAM 2 MG: 2 INJECTION INTRAMUSCULAR; INTRAVENOUS at 17:14

## 2022-01-01 RX ADMIN — GLYCOPYRROLATE 0.4 MG: 0.2 INJECTION INTRAMUSCULAR; INTRAVENOUS at 05:02

## 2022-01-01 RX ADMIN — GLYCOPYRROLATE 0.4 MG: 0.2 INJECTION INTRAMUSCULAR; INTRAVENOUS at 00:16

## 2022-01-01 RX ADMIN — LORAZEPAM 2 MG: 2 INJECTION INTRAMUSCULAR; INTRAVENOUS at 09:17

## 2022-01-01 RX ADMIN — LORAZEPAM 2 MG: 2 INJECTION INTRAMUSCULAR; INTRAVENOUS at 00:17

## 2022-01-01 RX ADMIN — GLYCOPYRROLATE 0.4 MG: 0.2 INJECTION INTRAMUSCULAR; INTRAVENOUS at 20:06

## 2022-01-01 RX ADMIN — LORAZEPAM 0.5 MG: 2 INJECTION INTRAMUSCULAR; INTRAVENOUS at 17:30

## 2022-01-19 PROBLEM — G93.1 ANOXIC BRAIN INJURY (HCC): Status: ACTIVE | Noted: 2022-01-01

## 2022-01-19 PROBLEM — Z51.5 PALLIATIVE CARE BY SPECIALIST: Status: ACTIVE | Noted: 2022-01-01

## 2022-01-19 PROBLEM — Z93.59 SUPRAPUBIC CATHETER (HCC): Status: ACTIVE | Noted: 2022-01-01

## 2022-01-19 PROBLEM — R53.2 FUNCTIONAL QUADRIPLEGIA (HCC): Status: ACTIVE | Noted: 2022-01-01

## 2022-01-19 PROBLEM — J69.0 ASPIRATION PNEUMONIA DUE TO GASTRIC SECRETIONS (HCC): Status: ACTIVE | Noted: 2022-01-01

## 2022-01-19 PROBLEM — Z93.1 PEG (PERCUTANEOUS ENDOSCOPIC GASTROSTOMY) STATUS (HCC): Status: ACTIVE | Noted: 2022-01-01

## 2022-01-19 PROBLEM — I51.9 LEFT VENTRICULAR DIASTOLIC DYSFUNCTION: Status: ACTIVE | Noted: 2022-01-01

## 2022-01-19 PROBLEM — J96.01 ACUTE RESPIRATORY FAILURE WITH HYPOXIA (HCC): Status: ACTIVE | Noted: 2022-01-01

## 2022-01-19 NOTE — ED NOTES
Blood draw attmpts partially successful, RN to look into obtaining better IV.      Sheba Gates, RN  01/19/22 1263

## 2022-01-19 NOTE — ED NOTES
Patient from [facility] post acute. Called for shortness of breath. Patient was recently admitted for aspiration PNA. Patient was found to be 75-80s on RA. Hx of TBI and does not communicate. Patient on NR at 15L 95%. Patient is also vomiting.      Luisana Nails, RN  01/19/22 7173

## 2022-01-19 NOTE — ED PROVIDER NOTES
I supervised care provided by the midlevel provider.   We have discussed this patient's history, physical exam, and treatment plan.  I have reviewed the note and personally saw and examined the patient and agree with the plan of care.   I have seen and evaluated this patient discussed the case with Anastacia Hansen the midlevel provider.  This gentleman was sent here from a nursing home he had copious amounts of vomit and had a lot of emesis and NT suctioning.  He developed acute respiratory distress with obvious congestion and wheezing.  His respiratory status is still significantly impaired but his improved some on his initial arrival.  He is on 5 L of oxygen at this time.  He is not able to provide any history to me.  Anastacia has spoke with the patient's sisters and they do not want any resuscitative efforts.  Does not want CPR and do not want him on a ventilator.  Anastacia is going to discuss further with the sisters if they just want comfort only for a full palliative care admission and and clarify that they do not want any antibiotics or any other medical treatment.    GENERAL: Elderly male that is in significant respiratory distress.  He is chronically ill and frail.  Appears chronically malnourished and has contractures.  HENT: nares patent  Head/neck/ face are symmetric without gross deformity or swelling  EYES: no scleral icterus  CV: Heart rate is 90 to low 100s.  It is regular rhythm.  I do not appreciate a murmur or rub.  RESPIRATORY: Patient is tachypneic with a respiratory rate in the upper 20s.  His O2 sat is 100% on 5 L.  He has upper airway congestion as well as some expiratory wheezing.  (He is received 2 treatments of albuterol prior to my arrival and NT suction)  ABDOMEN: soft and nontender patient has a G-tube and a suprapubic cath.  MUSCULOSKELETAL: Contractures and muscle wasting to all extremities.  NEURO: Patient's eyes will open and he will look around the room.  He is nonverbal with me.  Does not  follow commands.  Limited to no movement in all extremities.  SKIN: warm, dry    Vital signs and nursing notes reviewed.    Plan this gentleman is critically ill I reviewed his chest x-ray essentially is unremarkable.  This aspiration event just happened prior to arrival here.  There was a copious amounts of emesis and had some emesis on NT suctioning.  He has some reactive airway disease component as well.  Patient's blood pressure is unremarkable at this time.  There is an EMS DNR.    We are currently under a pandemic from the COVID19 infection.  The patient presented to the emergency department by ambulance or personal vehicle. I followed the current protocols required by Infection Control at [facility] in my evaluation and treatment of the patient. The patient was wearing a face mask during my evaluation and throughout my encounter. During my whole encounter with this patient I used appropriate personal protective equipment.  This equipment consisted of eye protection, facemask, gown, and gloves.  I applied this equipment before entering the room.    ED Course as of 01/19/22 1856   Wed Jan 19, 2022   1218 I phoned patient's next of kin a sister.  She will be calling the other sister to update her on pt condition.  I was asking about his DNR status due to he has an EMS DNR but according to nursing home records he is full code.   [EW]   1252 I also spoke with the patient's other sister on the phone.  They have agreed that the patient is to be a DNR.  I will enter this into the computer [EW]   1346 Pt's sister Bertin is here now and on phone with other sister.  We have discussed the patient's condition and the recurring aspiration pneumonia that is making him so ill.  He was just discharged from [facility] for the same this week.  The sisters are both in agreement with Palliative care admission.  I have discontinued antibiotics.  We discussed that he will only receive medications to make him  comfortable.  They are in agreement. I discussed with Dr. Taylor who has accepted patient to Palliative care.  [EW]   1414 Anastacia spoke with the patient's healthcare surrogates and medical power of  and they want the patient comfort measures only and full palliative care.  No antibiotics or other medical treatment other than to provide comfort and pain relief.  Family spoke with Dr. Taylor who agrees to accept the patient. [MM]   1446 EKG readingEKG was done at 1320It is a rate of 110 it is sinus tachycardiaIt is narrow complex with normal axisThere is artifact baseline from movement and the patient's respiratory statusI do not appreciate any acute injury patternPatient has diffuse nonspecific T wave changesQT looks normalI compared to the previous EKG which was from 11/4/2020.  It looks very similar other than the sinus tachycardia and a faster rate. [MM]      ED Course User Index  [EW] Anastacia Byers APRN  [MM] Jose Elias De Oliveira MD       Reviewed medical history:  Patient was just discharged from [facility] on January 18. I reviewed the discharge summary. He was discharged with pneumonia. He does have a suprapubic cath and a G-tube there is concerned of aspiration pneumonia. He has functional quadriplegia as well. Patient had a history of anoxic brain injury it appears if he was on 2 L of oxygen prior to his admission to [facility]. Patient is on Lovenox 40 mg grams subcutaneous a day. Also was discharged with Augmentin. Reviewed his other discharge medicine.    DICTATED UTILIZING DRAGON DICTATION  Much for all of this encounter note is an electronic transcription/translation of spoken language to printed text using Dragon dictation technology.  The electronic translation of spoken language may permit an inaccurate, erroneous, or at times, nonsensical words or phrases to be inadvertently transcribed.  Although, I have reviewed the note for such errors, some may still exist.     Jose Elias De Oliveira,  MD  01/19/22 4458

## 2022-01-19 NOTE — ED PROVIDER NOTES
EMERGENCY DEPARTMENT ENCOUNTER    Room Number:  03/03  Date seen:  1/19/2022  Time seen: 11:50 EST  PCP: Isma Cervantes MD  Historian: EMS, nursing home records    HPI:  Chief complaint: aspiration, n/v  A complete HPI/ROS/PMH/PSH/SH/FH are unobtainable due to: brain injury  Context:Manoj Jordan is a 66 y.o. 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  He has h/o this and was recently just d/c from this facility due to similar problem.  He is on tube feeds per G tube and has chronic suprapubic catheter. He is in respiratory distress upon arrival.      Patient was placed in face mask in first look. Patient was wearing facemask when I entered the room and throughout our encounter. I wore full protective equipment throughout this patient encounter including a N95 face mask, eye shield and gloves. Hand hygiene/washing of hands was performed before donning protective equipment and after removal when leaving the room.    MEDICAL RECORD REVIEW  Pt's sister states he was just discharged yesterday from [facility].     ALLERGIES  Patient has no known allergies.    PAST MEDICAL HISTORY  Active Ambulatory Problems     Diagnosis Date Noted   • Right inguinal hernia 01/23/2020   • Pneumonia due to COVID-19 virus 11/04/2020   • Quadriplegia (HCC)    • H/O cardiac arrest    • Brain injury (HCC)    • Hypernatremia 11/05/2020   • Thrombocytopenia, unspecified (HCC) 11/05/2020     Resolved Ambulatory Problems     Diagnosis Date Noted   • No Resolved Ambulatory Problems     Past Medical History:   Diagnosis Date   • Acute embolism and thrombosis of other thoracic veins (CMS/HCC)    • Agitation    • Allergic rhinitis    • Anxiety    • Aphasia    • Bowel obstruction (CMS/HCC)    • Calculus of gallbladder and bile duct w/o cholecystitis or obstruction    • Depression    • Disorder of  kidney and ureter    • Dysphagia    • GERD (gastroesophageal reflux disease)    • Heroin overdose (CMS/HCC)    • History of small bowel obstruction    • Other obstructive and reflux uropathy    • Pancreatitis    • Pneumonitis    • Pneumonitis due to inhalation of food or vomitus (CMS/HCC)    • Restless    • UTI (urinary tract infection)        PAST SURGICAL HISTORY  Past Surgical History:   Procedure Laterality Date   • BRONCHOSCOPY N/A 12/20/2018    w/ carlo, Aria Engel   • BRONCHOSCOPY N/A 04/04/2019    Clemente Russell   • GASTROSTOMY FEEDING TUBE INSERTION     • INGUINAL HERNIA REPAIR Right 2/19/2020    Procedure: RIGHT INGUINAL HERNIA REPAIR WITH MESH;  Surgeon: Erasmo Vital Jr., MD;  Location: [facility];  Service: General;  Laterality: Right;   • SUPRAPUBIC CATHETER INSERTION         FAMILY HISTORY  Family History   Family history unknown: Yes       SOCIAL HISTORY  Social History     Socioeconomic History   • Marital status:    Tobacco Use   • Smoking status: Former Smoker   • Smokeless tobacco: Never Used   Substance and Sexual Activity   • Alcohol use: No   • Drug use: No   • Sexual activity: Defer       REVIEW OF SYSTEMS  Review of Systems   Unable to perform ROS: Patient nonverbal   Pt has h/o anoxic brain injury      PHYSICAL EXAM    ED Triage Vitals [01/19/22 1123]   Temp Heart Rate Resp BP SpO2   98 °F (36.7 °C) 115 25 (!) 189/98 95 %      Temp src Heart Rate Source Patient Position BP Location FiO2 (%)   Axillary -- -- -- --     Physical Exam    I have reviewed the triage vital signs and nursing notes.      GENERAL: appears acutely ill  HENT: nares patent, rhinorrhea  EYES: no scleral icterus  NECK: no ROM limitations  CV: regular rhythm, mild tachycardia  RESPIRATORY:grunting, audible wheezing, rhonchi in lungs.   ABDOMEN: soft, g tube in place to abdomen  : deferred  MUSCULOSKELETAL: no deformity  NEURO: alert, moves all extremities, follows  commands  SKIN: warm, dry    LAB RESULTS  Recent Results (from the past 24 hour(s))   Procalcitonin    Collection Time: 01/19/22 12:13 PM    Specimen: Blood   Result Value Ref Range    Procalcitonin 0.15 0.00 - 0.25 ng/mL   Comprehensive Metabolic Panel    Collection Time: 01/19/22 12:13 PM    Specimen: Blood   Result Value Ref Range    Glucose 214 (H) 65 - 99 mg/dL    BUN 18 8 - 23 mg/dL    Creatinine 0.87 0.76 - 1.27 mg/dL    Sodium 144 136 - 145 mmol/L    Potassium 4.4 3.5 - 5.2 mmol/L    Chloride 108 (H) 98 - 107 mmol/L    CO2 30.1 (H) 22.0 - 29.0 mmol/L    Calcium 9.0 8.6 - 10.5 mg/dL    Total Protein 7.3 6.0 - 8.5 g/dL    Albumin 3.60 3.50 - 5.20 g/dL    ALT (SGPT) 32 1 - 41 U/L    AST (SGOT) 39 1 - 40 U/L    Alkaline Phosphatase 86 39 - 117 U/L    Total Bilirubin 0.8 0.0 - 1.2 mg/dL    eGFR Non African Amer 88 >60 mL/min/1.73    Globulin 3.7 gm/dL    A/G Ratio 1.0 g/dL    BUN/Creatinine Ratio 20.7 7.0 - 25.0    Anion Gap 5.9 5.0 - 15.0 mmol/L   CBC Auto Differential    Collection Time: 01/19/22 12:13 PM    Specimen: Blood   Result Value Ref Range    WBC 15.18 (H) 3.40 - 10.80 10*3/mm3    RBC 4.45 4.14 - 5.80 10*6/mm3    Hemoglobin 13.9 13.0 - 17.7 g/dL    Hematocrit 43.5 37.5 - 51.0 %    MCV 97.8 (H) 79.0 - 97.0 fL    MCH 31.2 26.6 - 33.0 pg    MCHC 32.0 31.5 - 35.7 g/dL    RDW 12.7 12.3 - 15.4 %    RDW-SD 44.9 37.0 - 54.0 fl    MPV 11.5 6.0 - 12.0 fL    Platelets 203 140 - 450 10*3/mm3    Neutrophil % 85.5 (H) 42.7 - 76.0 %    Lymphocyte % 5.5 (L) 19.6 - 45.3 %    Monocyte % 7.3 5.0 - 12.0 %    Eosinophil % 0.7 0.3 - 6.2 %    Basophil % 0.5 0.0 - 1.5 %    Immature Grans % 0.5 0.0 - 0.5 %    Neutrophils, Absolute 12.96 (H) 1.70 - 7.00 10*3/mm3    Lymphocytes, Absolute 0.84 0.70 - 3.10 10*3/mm3    Monocytes, Absolute 1.11 (H) 0.10 - 0.90 10*3/mm3    Eosinophils, Absolute 0.11 0.00 - 0.40 10*3/mm3    Basophils, Absolute 0.08 0.00 - 0.20 10*3/mm3    Immature Grans, Absolute 0.08 (H) 0.00 - 0.05 10*3/mm3    nRBC  0.1 0.0 - 0.2 /100 WBC   Lipase    Collection Time: 01/19/22 12:13 PM    Specimen: Blood   Result Value Ref Range    Lipase 43 13 - 60 U/L   ECG 12 Lead    Collection Time: 01/19/22  1:20 PM   Result Value Ref Range    QT Interval 326 ms         RADIOLOGY RESULTS  XR Chest 1 View    Result Date: 1/19/2022  ONE VIEW PORTABLE CHEST  HISTORY: Aspiration. Evaluate for pneumonia.  FINDINGS: The patient is rotated to the right. Allowing for the rotation, the lungs are clear and the heart size is normal and there is no evidence of aspiration pneumonia.  This report was finalized on 1/19/2022 1:29 PM by Dr. Charles Echavarria M.D.           PROGRESS, DATA ANALYSIS, CONSULTS AND MEDICAL DECISION MAKING  All labs have been independently reviewed by me.  All radiology studies have been reviewed by me and discussed with radiologist dictating the report.  EKG's independently viewed and interpreted by me unless stated otherwise. Discussion below represents my analysis of pertinent findings related to patient's condition, differential diagnosis, treatment plan and final disposition.     ED Course as of 01/19/22 1354   Wed Jan 19, 2022   1218 I phoned patient's next of kin a sister.  She will be calling the other sister to update her on pt condition.  I was asking about his DNR status due to he has an EMS DNR but according to nursing home records he is full code.   [EW]   1252 I also spoke with the patient's other sister on the phone.  They have agreed that the patient is to be a DNR.  I will enter this into the computer [EW]   1347 Pt's sister Bertin is here now and on phone with other sister.  We have discussed the patient's condition and the recurring aspiration pneumonia that is making him so ill.  He was just discharged from [facility] for the same this week.  The sisters are both in agreement with Palliative care admission.  I have discontinued antibiotics.  We discussed that he will only receive medications to make him  comfortable.  They are in agreement. I discussed with Dr. Taylor who has accepted patient to Palliative care.  [EW]      ED Course User Index  [EW] Anastacia Byers, LOLIS     DDX: recurrent aspiration pneumonia, pt's emesis smells of tube feeding, h/o TBI    MDM:  Given patient's condition and recurring concern for aspiration pneumonia the sisters who are POA wish for him to go to Palliative Care unit.  See note above.       Reviewed pt's history and workup with Dr. De Oliveira.  After a bedside evaluation, Dr. De Oliveira agrees with the plan of care.    Based on the patient's lab findings and presenting symptoms, the doctor and I feel it is appropriate to admit the patient for Palliative care and comfort measures.  I have discussed this with the admitting team.  I have also discussed this with the patient/family.  They are in agreement with admission.          Disposition vitals:  /70   Pulse 112   Temp 98 °F (36.7 °C) (Axillary)   Resp (!) 32   SpO2 96%       DIAGNOSIS  Final diagnoses:   Aspiration pneumonia due to gastric secretions, unspecified laterality, unspecified part of lung (HCC)   Anoxic brain injury (HCC)   H/O cardiac arrest       Palliative Care admission     Anastacia Byers, LOILS  01/19/22 8772

## 2022-01-19 NOTE — ED NOTES
Pt came via EMS, patient was vomiting, lung soungs very wet audible to naked ear.  Pt covered in vomit, fecal matter, sweat.  Legs and arms contracted and edematous.  Multiple IV attempts failed due to vein blowing.  Pt withdraws from pain. Pt has hx of anoxic brain injury and baseline is responsive to pain.       Sheba Gates, RN  01/19/22 4179

## 2022-01-19 NOTE — ED NOTES
Nursing report ED to floor  Manoj Jordan  66 y.o.  male    HPI :   Chief Complaint   Patient presents with   • Shortness of Breath       Admitting doctor:   Abe Taylor MD    Admitting diagnosis:   The primary encounter diagnosis was Aspiration pneumonia due to gastric secretions, unspecified laterality, unspecified part of lung (HCC). Diagnoses of Anoxic brain injury (HCC) and H/O cardiac arrest were also pertinent to this visit.    Code status:   Current Code Status     Date Active Code Status Order ID Comments User Context       1/19/2022 1253 No CPR (Do Not Attempt to Resuscitate) [id number]  Anastacia Byers, LOLIS ED     Advance Care Planning Activity      Questions for Current Code Status     Question Answer    Code Status (Patient has no pulse and is not breathing) No CPR (Do Not Attempt to Resuscitate)    Medical Interventions (Patient has pulse or is breathing) Comfort Measures    Level Of Support Discussed With Next of Kin (If No Surrogate)          Allergies:   Patient has no known allergies.    Intake and Output    Intake/Output Summary (Last 24 hours) at 1/19/2022 1408  Last data filed at 1/19/2022 1302  Gross per 24 hour   Intake 100 ml   Output --   Net 100 ml       Weight:   There were no vitals filed for this visit.    Most recent vitals:   Vitals:    01/19/22 1231 01/19/22 1241 01/19/22 1306 01/19/22 1321   BP:   130/63 136/70   Pulse: 111 111 109 112   Resp: (!) 31 (!) 32     Temp:       TempSrc:       SpO2: 96% 93% 99% 96%       Active LDAs/IV Access:   Lines, Drains & Airways     Active LDAs     Name Placement date Placement time Site Days    Peripheral IV 01/19/22 1222 Left  01/19/22  1222  --  thumb  less than 1    Gastrostomy/Enterostomy PEG-jejunostomy LUQ 02/19/19  0950  LUQ  1065    Suprapubic Catheter 02/19/05  0952   0374                Labs (abnormal labs have a star):   Labs Reviewed   COMPREHENSIVE METABOLIC PANEL - Abnormal; Notable for the following components:        "Result Value    Glucose 214 (*)     Chloride 108 (*)     CO2 30.1 (*)     All other components within normal limits    Narrative:     GFR Normal >60  Chronic Kidney Disease <60  Kidney Failure <15     CBC WITH AUTO DIFFERENTIAL - Abnormal; Notable for the following components:    WBC 15.18 (*)     MCV 97.8 (*)     Neutrophil % 85.5 (*)     Lymphocyte % 5.5 (*)     Neutrophils, Absolute 12.96 (*)     Monocytes, Absolute 1.11 (*)     Immature Grans, Absolute 0.08 (*)     All other components within normal limits   PROCALCITONIN - Normal    Narrative:     As a Marker for Sepsis (Non-Neonates):     1. <0.5 ng/mL represents a low risk of severe sepsis and/or septic shock.  2. >2 ng/mL represents a high risk of severe sepsis and/or septic shock.    As a Marker for Lower Respiratory Tract Infections that require antibiotic therapy:  PCT on Admission     Antibiotic Therapy             6-12 Hrs later  >0.5                          Strongly Recommended            >0.25 - <0.5             Recommended  0.1 - 0.25                  Discouraged                       Remeasure/reassess PCT  <0.1                         Strongly Discouraged         Remeasure/reassess PCT      As 28 day mortality risk marker: \"Change in Procalcitonin Result\" (>80% or <=80%) if Day 0 (or Day 1) and Day 4 values are available. Refer to http://www.[facility]-pct-calculator.com/    Change in PCT <=80 %   A decrease of PCT levels below or equal to 80% defines a positive change in PCT test result representing a higher risk for 28-day all-cause mortality of patients diagnosed with severe sepsis or septic shock.    Change in PCT >80 %   A decrease of PCT levels of more than 80% defines a negative change in PCT result representing a lower risk for 28-day all-cause mortality of patients diagnosed with severe sepsis or septic shock.               LIPASE - Normal   RESPIRATORY PANEL PCR W/ COVID-19 (SARS-COV-2) ILDEFONSO/KELSIE/LIDA/PAD/COR/MAD/BASHIR IN-HOUSE, NP SWAB IN " UTM/VTP, 3-4 HR TAT   CBC AND DIFFERENTIAL    Narrative:     The following orders were created for panel order CBC & Differential.  Procedure                               Abnormality         Status                     ---------                               -----------         ------                     CBC Auto Differential[[id number]]        Abnormal            Final result                 Please view results for these tests on the individual orders.       EKG:   ECG 12 Lead   Final Result   HEART RATE= 110  bpm   RR Interval= 544  ms   [place] Interval= 137  ms   P Horizontal Axis= 0  deg   P Front Axis= 75  deg   QRSD Interval= 77  ms   QT Interval= 326  ms   QRS Axis= 69  deg   T Wave Axis= 85  deg   - ABNORMAL ECG -   Sinus tachycardia   Nonspecific T abnormalities, lateral leads   When compared with ECG of 04-Nov-2020 4:42:57,   Significant rate increase otherwise no change   Electronically Signed By: Hola Mullen ([facility]) 19-Jan-2022 13:27:48   Date and Time of Study: 2022-01-19 13:20:59          Meds given in ED:   Medications   sodium chloride 0.9 % flush 10 mL (has no administration in time range)   albuterol (PROVENTIL) nebulizer solution 0.083% 2.5 mg/3mL (2.5 mg Nebulization Given 1/19/22 1231)   cefTRIAXone (ROCEPHIN) 1 g in sodium chloride 0.9 % 100 mL IVPB-VTB (0 g Intravenous Stopped 1/19/22 1302)       Imaging results:  No radiology results for the last day    Ambulatory status:       Social issues:   Social History     Socioeconomic History   • Marital status:    Tobacco Use   • Smoking status: Former Smoker   • Smokeless tobacco: Never Used   Substance and Sexual Activity   • Alcohol use: No   • Drug use: No   • Sexual activity: Defer       NIH Stroke Scale:        Nursing report ED to floor:       Sheba Gates, RN  01/19/22 6094

## 2022-01-20 NOTE — PROGRESS NOTES
Discharge Planning Assessment  [facility]     Patient Name: Manoj Jordan  MRN: [id number]  Today's Date: 1/20/2022    Admit Date: 1/19/2022     Discharge Needs Assessment    No documentation.                Discharge Plan     Row Name 01/20/22 1301       Plan    Plan Comments The patient transferred to [facility] from ER on 1/19/22. The patient is palliative. [facility] to evaluate. CCP will continue to follow for any needs that may arise. RICHARD Ritter RN, CCP.              Continued Care and Services - Admitted Since 1/19/2022    Coordination has not been started for this encounter.          Demographic Summary    No documentation.                Functional Status    No documentation.                Psychosocial    No documentation.                Abuse/Neglect    No documentation.                Legal    No documentation.                Substance Abuse    No documentation.                Patient Forms    No documentation.                   Erin Ritter RN

## 2022-01-20 NOTE — PROGRESS NOTES
Palliative Care/Hospice Follow Up Note       LOS: 1 day   Patient Care Team:  Isma Cervantes MD as PCP - General (Physical Medicine and Rehabilitation)  Abe Taylor MD as Attending Provider (Hospice and Palliative Medicine)    Chief Complaint:  Aspiration pneumonitis; anoxic encephalopathy    Interval History:     Patient Complaints: None  Patient Denies:  None  History taken from:  2 sisters and RN  Review of Systems:  As above.    Palliative Performance Scale  Palliative Performance Scale Score: 10%  [place] Symptom Assessment System Revised  Pain Score: 3   ESAS Tiredness Score: 8  ESAS Nausea Score: No nausea  ESAS Depression Score: unable to assess  ESAS Anxiety Score: No anxiety  ESAS Drowsiness Score: 8  ESAS Lack of Appetite Score: Worst lack of appetite  ESAS Wellbeing Score: unable to assess  ESAS Dyspnea Score: 2  ESAS Other Problem Score: unable to assess  ESAS Source of Information: healthcare professional caregiver  ESAS Intervention: medicated/see MAR  ESAS Intervention Response: tolerated    Vital Signs  Temp:  [98.3 °F (36.8 °C)-100.6 °F (38.1 °C)] 100 °F (37.8 °C)  Heart Rate:  [] 110  Resp:  [16-28] 16  BP: (113-121)/(54-72) 119/72  Device (Oxygen Therapy): high-flow nasal cannula; humidifiedFlow (L/min):  [3-6] 3SpO2:  [95 %-100 %] 95 %    Physical Exam:  General Appearance:    Not awake and in no acute distress lying on his left side, chronically ill-appearing older appearing male   Throat:   No oral lesions, oral mucosa somewhat moist   Neck:   No adenopathy, supple, trachea midline   Lungs:     Clear to auscultation with minimal rhonchi, respirations diminished and not labored    Heart:    Regular rhythm and tachycardia   Abdomen:     Occasional bowel sounds, soft and non-tender, non-distended, PEG tube left upper quadrant, suprapubic catheter   Extremities:   No edema, muscle wasting evident, pale and ashen and no cyanosis    Pulses:   Radial pulses palpable and equal  bilaterally          Results Review:     I reviewed the patient's new clinical results.    Medication Reviewed.    Assessment/Plan       Aspiration pneumonitis (HCC)    Acute respiratory failure with hypoxia (HCC)    Palliative care by specialist    Anoxic brain injury (HCC)    Functional quadriplegia (HCC)    Left ventricular diastolic dysfunction Echo 12/17/2018    PEG (percutaneous endoscopic gastrostomy) status (HCC)    Suprapubic catheter (HCC)      I reviewed all with the patient's 2 sisters at bedside.  With medications previously administered, the patient appears comfortable at the time of my examination.  The patient has received glycopyrrolate for airway congestion.  The patient has required 4 doses of 4 mg morphine, 2 doses yesterday, and 4 doses of 2 mg Ativan, 2 doses yesterday, thus far today.  Medications will be continued and adjusted as needed for symptom management for comfort.  I explained all of this to the patient's sisters.  I explained to them that hospice consult would be appropriate to admit the patient as a hospice scattered bed. Hospice ordered.    Plan for disposition:  NOLVIA Taylor MD  Hospice and Palliative Medicine  01/20/22  16:22 EST

## 2022-01-20 NOTE — CONSULTS
Prayer offered at bedside for patient. Prayed for peace and comfort of patient.  No family at bedside at time of visit.

## 2022-01-21 NOTE — PROGRESS NOTES
Case Management Discharge Note      Final Note: Admitted to a [facility] scattered bed on 1/21/22. RICHARD Ritter RN, CCP         Selected Continued Care - Admitted Since 1/19/2022     Destination Coordination complete.    Service Provider Selected Services Address Phone Fax Patient Preferred    [facility]  Inpatient Hospice [address], [facility] [address] [phone] --          Durable Medical Equipment    No services have been selected for the patient.              Dialysis/Infusion    No services have been selected for the patient.              Home Medical Care    No services have been selected for the patient.              Therapy    No services have been selected for the patient.              Community Resources    No services have been selected for the patient.              Community & DME    No services have been selected for the patient.                       Final Discharge Disposition Code: 51 - hospice medical facility

## 2022-01-21 NOTE — PLAN OF CARE
Problem: Adult Inpatient Plan of Care  Goal: Plan of Care Review  1/20/2022 2010 by Adria Kaplan RN  Outcome: Ongoing, Progressing  Flowsheets (Taken 1/20/2022 1838)  Progress: no change  Plan of Care Reviewed With: family  Outcome Summary: Patient medicated prior to turns for symptom management. Patient's family at bedside was updated on patient's status and plan of care. Hospice consult placed. Will continue to monitor per palliative goals of care.

## 2022-01-21 NOTE — PLAN OF CARE
Problem: Adult Inpatient Plan of Care  Goal: Patient-Specific Goal (Individualized)  1/21/2022 0451 by Gail Walter, RN  Flowsheets (Taken 1/21/2022 0451)  Patient-Specific Goals (Include Timeframe): Premedicate prior to turns  Individualized Care Needs: Premedicate prior to turns  Anxieties, Fears or Concerns: NA  1/21/2022 0450 by Gail Walter, RN  Outcome: Ongoing, Progressing   Goal Outcome Evaluation:  Plan of Care Reviewed With: patient, family        Progress: no change  Outcome Summary: Patient minimally responsive. Premedicated with 4mg of morphine, 2mg of ativan, and 0.4 mg of robinul. O2 decreased to 2L and pt tolerating. FC in place. New IV placed in left upper arm. Pt currently resting. Will continue to monitor for discomfort.

## 2022-01-21 NOTE — CONSULTS
[id number]    Primary diagnosis: G93.1 Anoxic brain damage    Patient is needing IV symptom management.    Met with sisters at bedside and provided explanation of services. Written material provided.     Family will use [facility]  home.    Thank you for the referral.

## 2022-01-22 PROBLEM — G93.1 ANOXIC BRAIN DAMAGE (HCC): Status: ACTIVE | Noted: 2022-01-01

## 2022-01-22 NOTE — PLAN OF CARE
Goal Outcome Evaluation:  Plan of Care Reviewed With: sibling        Progress: declining  Outcome Summary: Premedicated for turns with Morphine 4mg, ativan 2mg, and robinul 0.4mg. Pt is unrepsonsive at this time. Breathing pattern has become more shallow through the night. Sister remains at bedside and is aware of pt continued decline. Will continue to monitor.

## 2022-01-22 NOTE — PROGRESS NOTES
[facility] Visit Report    Manoj Jordan  [id number]  1/22/2022    Admission R/T [facility] Dx: YES      Reason for [facility] Admission: Anoxic brain injury, acute resp failure r/t pneumonitis      Symptom  Management: Pain, soa, restlessness and congestion      Nursing/Medication Recommendations: Please contact [facility] at [phone] for any questions or concerns and continue to provide comfort care per orders.      Psychosocial Issues and Recommendations: Provide support to patient and family       Spiritual Concerns and Recommendations: None at present      [facility] Discharge Plans:  None, patient in dying process and is not safe for transport. Requires daily [facility] RN assessment for symptom management of pain, soa, restlessness and congestion using IV medications, titrating doses as needed to maintain comfort. Patient is meeting criteria for GIP level of care.      Review of Visit: Arrived on unit. Spoke to staff IRENE Toribio for report and reviewed Epic notes. Entered room and patient lying in bed. Unresponsive to touch or to call of name. Eyes open slightly but no awareness. Color pale to ashen, nailbeds dusky. Breathing shallow with periods of apnea. 02 sat 81% on 2L. PPS 10%, bedbound with oral care only. .4-113-18 and /70. Suprapubic catheter in place with dark yellow urine noted. PEG in place and clamped. Close monitoring for safety, requires daily [facility] RN assessment, comfort care for patient in dying process who requires IV medication for symptom control. Spoke to sisters Reina and Bertin at the bedside regarding imminent status and they are aware and accepting, have been anticipating this change. Emotional support provided and encouraged them to contact [facility] 24/7 for any questions or concerns. Discussed care needs with staff IRENE Toribio and patient has received IV Morphine 4mg, IV Ativan 2mg and IV Robinul 0.4mg  x 6 doses each in the last 24 hours. Reviewed Dr. Taylor's note also. Patient appears comfortable and peaceful during visit with IV administration of medication, comfort is the goal per sisters. Will continue to see daily to assess needs, monitor status and offer support.        Patty Clemens RN  [facility] Visit Nurse

## 2022-01-22 NOTE — PLAN OF CARE
Goal Outcome Evaluation:  Plan of Care Reviewed With: sibling        Progress: declining  Outcome Summary: PPS 10%, actively dying. Pt made hospice scattered bed today. Premedicated with morphine, ativan and robinul. Pt febrile, breathing shallow and tachypnic at times. Extremities appear dusky, some mottling noted. Pt's sisters at bedside, aware of Pt's declining status. Pt appears calm and comfortable.

## 2022-01-22 NOTE — DISCHARGE SUMMARY
Date of Admission:   1/19/2022  Date of Discharge:   1/21/2022    Patient Care Team:  Isma Cervantes MD as PCP - General (Physical Medicine and Rehabilitation)  Abe Taylor MD as Attending Provider (Hospice and Palliative Medicine)    Discharge Diagnosis:     Aspiration pneumonitis (HCC)    Acute respiratory failure with hypoxia (HCC)    Palliative care by specialist    Anoxic brain injury ([facility])    Functional quadriplegia (HCC)    Left ventricular diastolic dysfunction Echo 12/17/2018    PEG (percutaneous endoscopic gastrostomy) status (HCC)    Suprapubic catheter (HCC)      Hospital Course  Patient is a 66 y.o. male who has anoxic brain injury associated with cardiac arrest from a heroin overdose in 2005, nursing home patient, who presented to the ED with acute onset nausea and vomiting of a moderate amount of yellow fluid with concern for aspiration.  The patient was admitted 1/15 and discharged 1/18/2022 from [facility] with presumptive aspiration pneumonia versus healthcare associated pneumonia.  The patient was admitted to [facility] between 11/3 and 11/9/2020 for pneumonia due to COVID-19.  The patient receives tube feeds per G tube and has chronic suprapubic catheter in place.  ED provider stated the patient was in respiratory distress upon arrival.  Admitting chest x-ray demonstrated no focal infiltrates.  Procalcitonin was negative. White count elevated to 15.18 with a left shift.  Respiratory viral panel all negative.  No COVID-19.     After initial evaluation, the patient's 2 sisters were contacted.  The patient did have an EMS DNR form; the nursing home records listed him as a full code.  After reviewing with the patient's sisters, [employer] of 's, the patient is a DNR and they wish the patient to receive comfort measures only.       When I evaluated the patient, the patient's 2 sisters and a niece were at bedside.  The patient was lying on the left side and  decreased tidal volume breaths noted with increased inspiratory effort.  The patient appears comfortable with medications previously administered.  The patient could not provide any ROS.    Subsequently, hospice met with the patient's sisters.  After reviewing with them, the patient was discharged from acute care and readmitted as a hospice scattered bed patient.    Procedures Performed: None     Pertinent Test Results: Reviewed    Condition on Discharge: Poor    Palliative Performance Scale  Palliative Performance Scale Score: 10%  [place] Symptom Assessment System Revised  Pain Score: no pain   ESAS Tiredness Score: Worst possible tiredness  ESAS Nausea Score: No nausea  ESAS Depression Score: unable to assess  ESAS Anxiety Score: No anxiety  ESAS Drowsiness Score: Worst possible drowsiness  ESAS Lack of Appetite Score: Worst lack of appetite  ESAS Wellbeing Score: 5  ESAS Dyspnea Score: 3  ESAS Other Problem Score: 3 (congestion)  ESAS Source of Information: healthcare professional caregiver  ESAS Intervention: medicated/see MAR  ESAS Intervention Response: tolerated    Vital Signs  Temp:  [101.3 °F (38.5 °C)-101.8 °F (38.8 °C)] 101.8 °F (38.8 °C)  Heart Rate:  [109-116] 116  Resp:  [16] 16  BP: (120-123)/(62-66) 120/62  Device (Oxygen Therapy): nasal cannulaFlow (L/min):  [2-3] 2SpO2:  [64 %-84 %] 64 %    Physical Exam:  General Appearance:    Not awake and in no acute distress lying on his left side, chronically ill-appearing older appearing male   Throat:   No oral lesions, oral mucosa somewhat moist   Neck:   No adenopathy, supple, trachea midline   Lungs:     Clear to auscultation with minimal rhonchi, respirations diminished  tidal volume breath with slight increased inspiratory effort     Heart:    Regular rhythm and tachycardia   Abdomen:     Occasional bowel sounds, soft and non-tender, non-distended, PEG tube left upper quadrant, suprapubic catheter   Extremities:  noted   Pulses:   Radial pulses  palpable and equal bilaterally       Discharge Disposition  Hospice/Medical Facility ([facility])    Discharge Medications: Same MAR      Discharge Diet: As tolerated      Activity at Discharge: As tolerated      Follow-up Appointments  No future appointments.      Test Results Pending at Discharge: None       Abe Taylor MD  01/21/22  19:19 EST    Time: I spent 40 minutes on this discharge activity which included: face-to-face encounter with the patient, reviewing the data in the system, coordination of the care with the nursing staff as well as documentation and entering orders.

## 2022-01-22 NOTE — H&P
Palliative Care/Hospice Admit/Consult Note       Referring Provider: Anastacia Byers APRN  Reason for Consultation: Palliative/Hospice Care  Date of Admission:  1/21/2022    Patient Care Team:  Isma Cervantes MD as PCP - General (Physical Medicine and Rehabilitation)  Abe Taylor MD as Attending Provider (Hospice and Palliative Medicine)    Chief complaint:  Anoxic brain damage; aspiration    History of present illness:  The patient is a 66 y.o. male who has anoxic brain injury associated with cardiac arrest from a heroin overdose in 2005, nursing home patient, who presented to the ED with acute onset nausea and vomiting of a moderate amount of yellow fluid with concern for aspiration.  The patient was admitted 1/15 [facility] versus healthcare associated pneumonia.  The patient was admitted to [facility] between 11/3 and 11/9/2020 for pneumonia due to COVID-19.  The patient receives tube feeds per G tube and has chronic suprapubic catheter in place.  ED provider stated the patient was in respiratory distress upon arrival.    Admitting chest x-ray demonstrated no focal infiltrates.  Procalcitonin was negative. White count elevated to 15.18 with a left shift.  Respiratory viral panel all negative.  No COVID-19.    After initial evaluation, the patient's 2 sisters were contacted.  The patient did have an EMS DNR form the [facility] records listed him as a full code.  After reviewing with the patient's sisters, [employer] power of 's, the patient is a DNR and they wish the patient to receive comfort measures only.  Hospice evaluated the patient and all agreed to discharge him from acute care and readmit him as a hospice scattered bed patient.    At the time of my evaluation, the patient's 2 sisters were at bedside.  The patient has been medicated with morphine, Ativan, and Robinul.  He appears comfortable during  my evaluation.  The patient's sisters reports he appears comfortable appearing since being medicated.  No ROS obtainable.    Review of Systems  Pertinent items are noted in HPI    Palliative Performance Scale  Palliative Performance Scale Score: 10%  [place] Symptom Assessment System Revised  Pain Score: no pain   ESAS Tiredness Score: Worst possible tiredness  ESAS Nausea Score: No nausea  ESAS Depression Score: unable to assess  ESAS Anxiety Score: No anxiety  ESAS Drowsiness Score: Worst possible drowsiness  ESAS Lack of Appetite Score: Worst lack of appetite  ESAS Wellbeing Score: 4  ESAS Dyspnea Score: No shortness of breath  ESAS Other Problem Score: unable to assess  ESAS Source of Information: healthcare professional caregiver  ESAS Intervention: medicated/see MAR  ESAS Intervention Response: tolerated    History  Past Medical History:   Diagnosis Date   • Acute embolism and thrombosis of other thoracic veins (CMS/HCC)    • Agitation    • Allergic rhinitis    • Anxiety    • Aphasia    • Bowel obstruction (CMS/HCC)    • Brain injury (CMS/HCC)    • Calculus of gallbladder and bile duct w/o cholecystitis or obstruction    • Depression    • Disorder of kidney and ureter    • Dysphagia    • GERD (gastroesophageal reflux disease)    • H/O cardiac arrest     DUE TO HEROIN OVERDOSE IN 2005   • Heroin overdose (CMS/HCC)    • History of small bowel obstruction    • Other obstructive and reflux uropathy    • Pancreatitis    • Pneumonitis    • Pneumonitis due to inhalation of food or vomitus (CMS/HCC)    • Quadriplegia (CMS/HCC)    • Restless    • UTI (urinary tract infection)    ,   Past Surgical History:   Procedure Laterality Date   • BRONCHOSCOPY N/A 12/20/2018    w/ washing, Aria Engel   • BRONCHOSCOPY N/A 04/04/2019    Clemente Russell   • GASTROSTOMY FEEDING TUBE INSERTION     • INGUINAL HERNIA REPAIR Right 2/19/2020    Procedure: RIGHT INGUINAL HERNIA REPAIR WITH MESH;  Surgeon:  Erasmo Vital Jr., MD;  Location: [facility];  Service: General;  Laterality: Right;   • SUPRAPUBIC CATHETER INSERTION     ,   Family History   Family history unknown: Yes    and   Social History     Socioeconomic History   • Marital status:    Tobacco Use   • Smoking status: Former Smoker   • Smokeless tobacco: Never Used   Substance and Sexual Activity   • Alcohol use: No   • Drug use: No   • Sexual activity: Defer     E-cigarette/Vaping     E-cigarette/Vaping Substances     E-cigarette/Vaping Devices        Allergy Patient has no known allergies.    Vital Signs   Temp:  [101.4 °F (38.6 °C)-101.8 °F (38.8 °C)] 101.4 °F (38.6 °C)  Heart Rate:  [113-116] 113  Resp:  [16-18] 18  BP: (118-120)/(62-70) 118/70  Device (Oxygen Therapy): high-flow nasal cannula; humidifiedFlow (L/min):  [2] 2SpO2:  [64 %-81 %] 81 %    Physical Exam:  General Appearance:   Not awake and appears in no acute distress lying on his left side, chronically ill-appearing older appearing male   Head:    Normocephalic, without obvious abnormality, atraumatic   Eyes:            Lids and lashes normal, conjunctivae and sclerae normal, no icterus   Ears:    Ears appear intact with no abnormalities noted   Throat:   No oral lesions, oral mucosa moist   Neck:   No adenopathy, supple, trachea midline, no thyromegaly   Back:     No scoliosis present   Lungs:     Clear to auscultation with occasional scattered rhonchi, respirations diminished with slight increased inspiratory effort and decreased tidal volume breath     Heart:    Regular rhythm and tachycardia        Abdomen:   Occasional bowel sounds, soft and non-tender, non-distended, PEG tube left upper quadrant, suprapubic catheter   Genitalia:    Deferred   Extremities:  No edema, muscle wasting evident, pale and ashen and no cyanosis    Pulses:  Radial pulses palpable and equal bilaterally   Skin:   No bleeding         Neurologic:  Not awake to test      Results Review:   I reviewed  the patient's new clinical results.    Impression:      Anoxic brain injury (HCC)    Acute respiratory failure with hypoxia (HCC)    Hospice care patient    Aspiration pneumonitis (HCC)    Functional quadriplegia (HCC)    Left ventricular diastolic dysfunction Echo 12/17/2018    PEG (percutaneous endoscopic gastrostomy) status (HCC)    Suprapubic catheter ([facility])      Plan:  I reviewed with the patient's 2 sisters at bedside.  With medications previously administered, the patient appears comfortable.  The patient has received glycopyrrolate for airway congestion.  The patient has required 3 doses of 4 mg morphine, 6 doses yesterday, and 3 doses of 2 mg Ativan, 6 doses yesterday, thus far today.  Medications will be continued and adjusted as needed for symptom management for comfort.  I reviewed all with the sisters and I answered all of their questions.      Abe Taylor MD  Hospice and Palliative Medicine  01/22/22  12:02 EST
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
## 2022-01-23 NOTE — PROGRESS NOTES
Palliative Care/Hospice Follow Up Note       LOS: 2 days   Patient Care Team:  Isma Cervantes MD as PCP - General (Physical Medicine and Rehabilitation)  Abe Taylor MD as Attending Provider (Hospice and Palliative Medicine)    Chief Complaint:  Aspiration pneumonitis; anoxic encephalopathy    Interval History:     Patient Complaints: None  Patient Denies:  None  History taken from:  Sister and RN and hospice RN  Review of Systems:  As above.    Palliative Performance Scale  Palliative Performance Scale Score: 10%  [place] Symptom Assessment System Revised  Pain Score: 2   ESAS Tiredness Score: Worst possible tiredness  ESAS Nausea Score: No nausea  ESAS Depression Score: unable to assess  ESAS Anxiety Score: 2  ESAS Drowsiness Score: Worst possible drowsiness  ESAS Lack of Appetite Score: Worst lack of appetite  ESAS Wellbeing Score: 2  ESAS Dyspnea Score: 2  ESAS Other Problem Score: 4 (congestion)  ESAS Source of Information: family caregiver, healthcare professional caregiver  ESAS Intervention: medicated/see MAR  ESAS Intervention Response: tolerated    Vital Signs  Temp:  [98 °F (36.7 °C)-99.4 °F (37.4 °C)] 98.5 °F (36.9 °C)  Heart Rate:  [107-115] 115  Resp:  [16-18] 16  BP: ()/(60-66) 103/66  Device (Oxygen Therapy): humidified; nasal cannulaFlow (L/min):  [2] 2SpO2:  [85 %-87 %] 87 %    Physical Exam:  General Appearance:    Not awake and in no acute distress lying on his left side, chronically ill-appearing older appearing male   Throat:   No oral lesions, oral mucosa somewhat moist   Neck:   No adenopathy, supple, trachea midline   Lungs:     Clear to auscultation with minimal/no rhonchi, respirations diminished with slight increase inspiratory effort and decreased tidal volume breaths     Heart:    Regular rhythm and tachycardia   Abdomen:     Occasional bowel sounds, soft and non-tender, non-distended, PEG tube left upper quadrant, suprapubic catheter   Extremities:  evident   Pulses:    Radial pulses palpable and equal bilaterally          Results Review:     I reviewed the patient's new clinical results.    Medication Reviewed.    Assessment/Plan       Anoxic brain injury (HCC)    Acute respiratory failure with hypoxia ([facility])    Hospice care patient    Aspiration pneumonitis (HCC)    Functional quadriplegia (HCC)    Left ventricular diastolic dysfunction Echo 12/17/2018    PEG (percutaneous endoscopic gastrostomy) status (HCC)    Suprapubic catheter (HCC)    Anoxic brain damage (HCC)      I reviewed with the patient's sister at bedside.    I also reviewed with the hospice RN that was at bedside at the same time.  With medications previously administered, the patient appears comfortable.  The patient has received glycopyrrolate for airway congestion.  The patient has required 3 doses of 4 mg morphine, 6 doses yesterday, and 3 doses of 2 mg Ativan, 6 doses yesterday, thus far today.  Medications will be continued and adjusted as needed for symptom management for comfort.    I explained to the patient's sister that the patient is demonstrating decline daily.  I answered all of her questions.      Plan for disposition:  NOLVIA Taylor MD  Hospice and Palliative Medicine  01/23/22  10:58 EST

## 2022-01-23 NOTE — PROGRESS NOTES
[facility] Visit Report    Manoj Jordan  [id number]  1/23/2022    Admission R/T [facility] Dx: YES      Reason for [facility] Admission: Anoxic brain injury, acute resp failure r/t pneumonitis      Symptom  Management: Pain, soa, restlessness and congestion      Nursing/Medication Recommendations: Please contact [facility] at [phone] for any questions or concerns and continue to provide comfort care per orders.      Psychosocial Issues and Recommendations: Provide support to patient and family      Spiritual Concerns and Recommendations: None at present      [facility] Discharge Plans:  None, patient in active dying process and is not safe for transport. Requires daily [facility] RN assessment for symptom management of pain, soa, restlessness an congestion using IV medications, titrating doses as needed to maintain comfort. Patient is meeting criteria for [facility] level of care.      Review of Visit: Arrived on unit. Spoke to staff IRENE Reynolds for report and reviewd Epic notes. Entered room and patient lying in bed on his side in the recovery position. Unresponsive to touch and to call of name. Color ashen, nailbeds dusky. Bilateral feet discolored slightly. Breathing shallow, unlabored with some congestion noted. 02 sat 87% on 2L. PPS 10%, bed bound with oral care only. VS 98.5-115 /66. PEG tube in place and clamped. Suprapubic cath to BSD with dark yellow urine noted. Close monitoring for safety, requires daily [facility] RN assessment, comfort care for patient in active dying process who requires IV medication for symptom control. Spoke to sister Reina at the bedside, along with , regarding imminent status and end of life symptom management. She verbalizes understanding and is accepting. Emotional support provided and encouraged her to contact [facility] 24/7 for any further questions or concerns. Discussed care needs with staff IRENE Reynolds  and patient has received IV Morphine 4mg and IV Ativan 2mg x 7 doses each. Also IV Robinul 0.4mg x 5 doses, titrated to 0.8mg x 4 doses, all in the last 24 hours. Patient appears comfortable and peaceful during visit with IV administration of medication, comfort is the goal per sister. Will continue to see daily to assess needs, monitor status and offer support.        Patty Clemens RN  hospitals Visit Nurse

## 2022-01-23 NOTE — DISCHARGE SUMMARY
Discharge As      Date of Admisssion:  2022  Date of Death:  2022  Time of Death:  12:18 PM    Patient Care Team:  Isma Cervantes MD as PCP - General (Physical Medicine and Rehabilitation)  Abe Taylor MD as Attending Provider (Hospice and Palliative Medicine)    Final Diagnosis:     Anoxic brain injury (HCC)    Acute respiratory failure with hypoxia (HCC)    Hospice care patient    Aspiration pneumonitis (HCC)    Functional quadriplegia (HCC)    Left ventricular diastolic dysfunction Echo 2018    PEG (percutaneous endoscopic gastrostomy) status (HCC)    Suprapubic catheter (HCC)    Anoxic brain damage (HCC)    Death in hospice      Hospital Course  Patient was a 66 y.o. male who has anoxic brain injury associated with cardiac arrest from a heroin overdose in , nursing home patient, who presented to the ED with acute onset nausea and vomiting of a moderate amount of yellow fluid with concern for aspiration.  The patient was admitted 1/15 and discharged 2022 from [facility] with presumptive aspiration pneumonia versus healthcare associated pneumonia.  The patient was admitted to [facility] between 11/3 and 2020 for pneumonia due to COVID-19.  The patient receives tube feeds per G tube and has chronic suprapubic catheter in place.  ED provider stated the patient was in respiratory distress upon arrival.    Admitting chest x-ray demonstrated no focal infiltrates.  Procalcitonin was negative. White count elevated to 15.18 with a left shift.  Respiratory viral panel all negative.  No COVID-19.     After initial evaluation, the patient's 2 sisters were contacted.  The patient did have an EMS DNR form the [facility] records listed him as a full code.  After reviewing with the patient's sisters, Covid power of 's, the patient is a DNR and they wish the patient to receive comfort measures only.  Hospice evaluated the patient and all agreed to  discharge him from acute care and readmit him as a hospice scattered bed patient.  Medications were provided and adjusted as needed for symptom management for comfort.  I discussed with the patient's sisters daily.  The patient demonstrated decline every day since hospitalized.  Please see my note from 1/23/2022 at 0920 hours.  Declining condition described to the patient's sister.  Subsequently, I was called that the patient's respirations ceased and no pulse palpable. No heart sounds audible. I pronounced the patient at 1218 hours.    Time: I spent 25 minutes on this discharge activity which included: face-to-face encounter with the patient, reviewing the data in the system, coordination of the care with the nursing staff as well as documentation and entering orders.       Abe Taylor MD  Hospice and Palliative Medicine  01/23/22  18:22 EST

## 2022-01-23 NOTE — PLAN OF CARE
Goal Outcome Evaluation:              Outcome Summary: Premedicate prior to turns. New IV placed. Appears comfortable, will cont to monitor

## 2022-01-24 NOTE — PROGRESS NOTES
Case Management Discharge Note      Final Note: The patient  on 22 @ 12:18. BLuz Ritter RN, CCP.         Selected Continued Care - Discharged on 2022 Admission date: 2022 - Discharge disposition:     Destination Coordination complete.    Service Provider Selected Services Address Phone Fax Patient Preferred    [facility]  Inpatient Hospice [address], [facility] [id number] [phone] --          Durable Medical Equipment    No services have been selected for the patient.              Dialysis/Infusion    No services have been selected for the patient.              Home Medical Care    No services have been selected for the patient.              Therapy    No services have been selected for the patient.              Community Resources    No services have been selected for the patient.              Community & DME    No services have been selected for the patient.                Selected Continued Care - Prior Encounters Includes selections from prior encounters from 10/23/2021 to 2022    Discharged on 2022 Admission date: 2022 - Discharge disposition: Hospice/Medical Facility ([facility])    Destination     Service Provider Selected Services Address Phone Fax Patient Preferred    [facility]  Inpatient Hospice [address], [facility] [id number] [phone] --                         Final Discharge Disposition Code: 41 -  in medical facility

## 2023-10-30 NOTE — PROGRESS NOTES
Name: Manoj Jordan ADMIT: 11/3/2020   : 1955  PCP: Isma Cervantes MD    MRN: [id number] LOS: 4 days   AGE/SEX: 65 y.o. male  ROOM: [facility]     Subjective   Subjective   Discussed with nursing staff no new problems noted    Review of Systems   Unable to perform ROS: Patient nonverbal        Objective   Objective   Vital Signs  Temp:  [96.8 °F (36 °C)-97.7 °F (36.5 °C)] 97.5 °F (36.4 °C)  Heart Rate:  [83-92] 92  Resp:  [20] 20  BP: (125-140)/() 137/68  SpO2:  [91 %-93 %] 92 %  on   ;   Device (Oxygen Therapy): room air  Body mass index is 18.64 kg/m².  Physical Exam  Vitals signs and nursing note reviewed.   Constitutional:       General: He is not in acute distress.  HENT:      Head: Normocephalic and atraumatic.      Mouth/Throat:      Pharynx: Oropharynx is clear.   Eyes:      General: No scleral icterus.  Neck:      Musculoskeletal: Neck supple.   Cardiovascular:      Rate and Rhythm: Normal rate and regular rhythm.      Pulses: Normal pulses.      Heart sounds: Normal heart sounds.   Pulmonary:      Effort: Pulmonary effort is normal.      Breath sounds: Normal breath sounds.   Abdominal:      General: Abdomen is flat. Bowel sounds are normal.      Palpations: Abdomen is soft.   Neurological:      General: No focal deficit present.      Mental Status: He is alert. Mental status is at baseline. Results Review     I reviewed the patient's new clinical results.  Results from last 7 days   Lab Units 20  0641 20  0858 20  0729 20  0740   WBC 10*3/mm3 6.39 5.76 6.52 4.15   HEMOGLOBIN g/dL 12.6* 13.7 12.9* 13.2   PLATELETS 10*3/mm3 151 145 139* 118*     Results from last 7 days   Lab Units 20  0641 20  0858 20  0729 20  1343 20  0740   SODIUM mmol/L 144 146* 150*  --  147*   POTASSIUM mmol/L 4.4 4.5 4.7  --  4.8   CHLORIDE mmol/L 108* 110* 114*  --  110*   CO2 mmol/L 30.4* 27.0 26.6  --  30.1*   BUN mg/dL 41* 49* 46*  --  36*   CREATININE  mg/dL 0.87 1.03 1.02 1.04 0.95   GLUCOSE mg/dL 119* 191* 142*  --  124*   Estimated Creatinine Clearance: 60.8 mL/min (by C-G formula based on SCr of 0.87 mg/dL).  Results from last 7 days   Lab Units 11/07/20  0641 11/06/20  0858 11/05/20  0729 11/04/20  1343   ALBUMIN g/dL 3.00* 3.10* 3.20* 3.20*   BILIRUBIN mg/dL 0.8 0.7 0.6 0.9   ALK PHOS U/L 70 72 76 76   AST (SGOT) U/L 31 43* 50* 47*   ALT (SGPT) U/L 32 35 35 34     Results from last 7 days   Lab Units 11/07/20  0641 11/06/20  0858 11/05/20  0729 11/04/20  1343 11/04/20  0740   CALCIUM mg/dL 9.0 9.2 9.2  --  9.3   ALBUMIN g/dL 3.00* 3.10* 3.20* 3.20*  --      Results from last 7 days   Lab Units 11/04/20  0740 11/03/20  2342   PROCALCITONIN ng/mL 0.18 0.19   LACTATE mmol/L  --  1.3     COVID19   Date Value Ref Range Status   11/04/2020 Detected (C) Not Detected - Ref. Range Final     No results found for: HGBA1C, POCGLU    XR Chest 1 View  Narrative: SINGLE VIEW CHEST     HISTORY: Shortness of air. Rule out COVID 19.     COMPARISON: None available.     FINDINGS:  Heart size is within normal limits. Bilateral infiltrates are seen.  These are in an upper lobe predominant distribution. No pneumothorax or  pleural effusion is seen     Impression: Bilateral pulmonary infiltrates.     This report was finalized on 11/4/2020 12:09 AM by Dr. Mariely Peña M.D.       Scheduled Medications  cholecalciferol, 5,000 Units, Oral, Daily  dexamethasone, 6 mg, Per G Tube, Daily With Breakfast  enoxaparin, 40 mg, Subcutaneous, Q24H  [START ON 11/9/2020] lansoprazole, 30 mg, Per G Tube, Daily  Menthol-Zinc Oxide, , Topical, BID  remdesivir, 100 mg, Intravenous, Q24H  sodium chloride, 10 mL, Intravenous, Q12H    Infusions  dextrose, 50 mL/hr, Last Rate: 50 mL/hr (11/07/20 1259)  Pharmacy Consult - Remdesivir,     Diet  NPO Diet       Assessment/Plan     Active Hospital Problems    Diagnosis  POA   • **Pneumonia due to COVID-19 virus [U07.1, J12.89]  Yes   • Hypernatremia  [E87.0]  Unknown   • Thrombocytopenia, unspecified (CMS/[facility]) [D69.6]  Unknown   • Quadriplegia (CMS/[facility]) [G82.50]  Unknown   • H/O cardiac arrest [Z86.74]  Not Applicable   • Brain injury (CMS/[facility]) [S06.9X9A]  Unknown      Resolved Hospital Problems   No resolved problems to display.       65 y.o. male admitted with Pneumonia due to COVID-19 virus.    · COVID-19 pneumonia: Continue supportive care.  Decadron, remdesivir.  Patient is now on room air. No fever.  Remdesivir to be completed in the a.m.  · Hypernatremia: D5 W another day  · Thrombocytopenia from coronavirus infection improved  · Patient was started on antibiotics for UTI.  However no signs/symptoms to suggest UTI in patient with chronic suprapubic catheter.  Culture growing mixed  · Lovenox 40 mg SC daily for DVT prophylaxis.  · Full code.  · Discussed with nursing staff and care team on multidisciplinary rounds.  · Anticipate discharge to SNU facility when bed available.         Deepak Fernandez MD  [facility]  11/08/20  11:41 EST      I wore protective equipment throughout this patient encounter including a face mask, gloves and protective eyewear.  Hand hygiene was performed before donning protective equipment and after removal when leaving the room.         - Call your doctor if you experience:  • An increase in pain not controlled by pain medication or change in activity or  position.  • Temperature greater than 101° F.  • Redness, increased swelling or foul smelling drainage from or around the  incision.  • Numbness, tingling or a change in color or temperature of the operative extremity.  • Call your doctor immediately if you experience chest pain, shortness of breath or calf pain.
